# Patient Record
Sex: FEMALE | Race: AMERICAN INDIAN OR ALASKA NATIVE | NOT HISPANIC OR LATINO | URBAN - METROPOLITAN AREA
[De-identification: names, ages, dates, MRNs, and addresses within clinical notes are randomized per-mention and may not be internally consistent; named-entity substitution may affect disease eponyms.]

---

## 2017-03-22 ENCOUNTER — INPATIENT (INPATIENT)
Facility: HOSPITAL | Age: 77
LOS: 0 days | Discharge: HOME | End: 2017-03-23
Attending: EMERGENCY MEDICINE | Admitting: INTERNAL MEDICINE

## 2017-06-27 DIAGNOSIS — R51 HEADACHE: ICD-10-CM

## 2017-06-27 DIAGNOSIS — H53.8 OTHER VISUAL DISTURBANCES: ICD-10-CM

## 2017-06-27 DIAGNOSIS — Z95.1 PRESENCE OF AORTOCORONARY BYPASS GRAFT: ICD-10-CM

## 2017-06-27 DIAGNOSIS — R42 DIZZINESS AND GIDDINESS: ICD-10-CM

## 2017-06-27 DIAGNOSIS — I25.10 ATHEROSCLEROTIC HEART DISEASE OF NATIVE CORONARY ARTERY WITHOUT ANGINA PECTORIS: ICD-10-CM

## 2020-04-09 ENCOUNTER — APPOINTMENT (OUTPATIENT)
Dept: HEMATOLOGY ONCOLOGY | Facility: CLINIC | Age: 80
End: 2020-04-09

## 2020-06-09 ENCOUNTER — OUTPATIENT (OUTPATIENT)
Dept: OUTPATIENT SERVICES | Facility: HOSPITAL | Age: 80
LOS: 1 days | Discharge: HOME | End: 2020-06-09
Payer: MEDICARE

## 2020-06-09 DIAGNOSIS — R92.8 OTHER ABNORMAL AND INCONCLUSIVE FINDINGS ON DIAGNOSTIC IMAGING OF BREAST: ICD-10-CM

## 2020-06-09 PROCEDURE — G0279: CPT | Mod: 26

## 2020-06-09 PROCEDURE — 77066 DX MAMMO INCL CAD BI: CPT | Mod: 26

## 2020-06-09 PROCEDURE — 76641 ULTRASOUND BREAST COMPLETE: CPT | Mod: 26,RT

## 2020-06-17 ENCOUNTER — OUTPATIENT (OUTPATIENT)
Dept: OUTPATIENT SERVICES | Facility: HOSPITAL | Age: 80
LOS: 1 days | Discharge: HOME | End: 2020-06-17
Payer: MEDICARE

## 2020-06-17 ENCOUNTER — RESULT REVIEW (OUTPATIENT)
Age: 80
End: 2020-06-17

## 2020-06-17 DIAGNOSIS — R92.8 OTHER ABNORMAL AND INCONCLUSIVE FINDINGS ON DIAGNOSTIC IMAGING OF BREAST: ICD-10-CM

## 2020-06-17 PROCEDURE — 77065 DX MAMMO INCL CAD UNI: CPT | Mod: 26,RT

## 2020-06-17 PROCEDURE — 19083 BX BREAST 1ST LESION US IMAG: CPT | Mod: RT

## 2020-06-17 PROCEDURE — 88342 IMHCHEM/IMCYTCHM 1ST ANTB: CPT | Mod: 26

## 2020-06-17 PROCEDURE — 88341 IMHCHEM/IMCYTCHM EA ADD ANTB: CPT | Mod: 26

## 2020-06-17 PROCEDURE — 88305 TISSUE EXAM BY PATHOLOGIST: CPT | Mod: 26

## 2020-06-18 LAB — SURGICAL PATHOLOGY STUDY: SIGNIFICANT CHANGE UP

## 2020-06-22 DIAGNOSIS — C50.911 MALIGNANT NEOPLASM OF UNSPECIFIED SITE OF RIGHT FEMALE BREAST: ICD-10-CM

## 2020-06-22 DIAGNOSIS — Z17.0 ESTROGEN RECEPTOR POSITIVE STATUS [ER+]: ICD-10-CM

## 2020-06-23 ENCOUNTER — APPOINTMENT (OUTPATIENT)
Dept: BREAST CENTER | Facility: CLINIC | Age: 80
End: 2020-06-23
Payer: MEDICARE

## 2020-06-23 VITALS
TEMPERATURE: 98.1 F | HEIGHT: 61 IN | WEIGHT: 120 LBS | SYSTOLIC BLOOD PRESSURE: 110 MMHG | BODY MASS INDEX: 22.66 KG/M2 | DIASTOLIC BLOOD PRESSURE: 70 MMHG

## 2020-06-23 DIAGNOSIS — Z86.79 PERSONAL HISTORY OF OTHER DISEASES OF THE CIRCULATORY SYSTEM: ICD-10-CM

## 2020-06-23 PROCEDURE — 99204 OFFICE O/P NEW MOD 45 MIN: CPT

## 2020-06-23 RX ORDER — CLOPIDOGREL 75 MG/1
TABLET, FILM COATED ORAL
Refills: 0 | Status: ACTIVE | COMMUNITY

## 2020-06-23 RX ORDER — ASPIRIN 325 MG/1
TABLET, FILM COATED ORAL
Refills: 0 | Status: ACTIVE | COMMUNITY

## 2020-06-23 RX ORDER — CARVEDILOL 3.12 MG/1
TABLET, FILM COATED ORAL
Refills: 0 | Status: ACTIVE | COMMUNITY

## 2020-06-23 RX ORDER — FAMOTIDINE 10 MG/1
TABLET, FILM COATED ORAL
Refills: 0 | Status: ACTIVE | COMMUNITY

## 2020-06-24 RX ORDER — LISINOPRIL 30 MG/1
TABLET ORAL
Refills: 0 | Status: ACTIVE | COMMUNITY

## 2020-06-24 NOTE — ASSESSMENT
[FreeTextEntry1] : Dariana is a 79 postmenopausal F with RIGHT breast cancer, [id number], ER/[place] pos, Her 2 neg, stage 1 breast cancer. \par \par On exam, I was able to palpate a 2-3 cm mass in her right superior breast that is mobile with some overlying ecchymoses from her recent biopsy.  NO suspicious abnormalities were palpated in her left breast and no lymphadenopathy was appreciated b/l.  Her most recent imaging was a b/l dx mammogram and R breast US on 6/9/2020 which revealed in her RIGHT breast @12N3-4, a 1.4 x 1 x 1.5 cm mass which was her biopsy proven IDC. \par \par We have discussed her new diagnosis of breast cancer.  She is currently a stage 1 breast cancer, based off AJCC 8th edition.  We discussed her surgical and other treatment options at this time. \par \par In regards to her RIGHT sided breast cancer, she is a great candidate for either breast conservation surgery or a mastectomy.  Her lesion is located @12N3-4   based off the US so she is a candidate for a nipple sparing mastectomy.  However, there is no difference in survival between a lumpectomy + radiation therapy and a mastectomy.  However the rate of local recurrence is slightly higher with the lumpectomy. The rate of recurrence with a mastectomy is not zero, however, and is likely closer to 4-9%.  \par \par Regardless of if she chooses a mastectomy or a lumpectomy, she would require an evaluation of her axillary lymph nodes via a sentinel lymph node biopsy.  This is done by injecting the perioareolar breast tissue with dye prior to the surgery and removing 1-5 lymph nodes that are the first to drain the breast.  However, based off [facility] choosing wisely guidelines for patient >71 y/o with a early stage ER/WA pos, Her 2 negative breast cancer, one could consider omitting the SLN Bx.  This was discussed with the patient, however, given the suspicion for lymphovascular invasion on her biopsy, I have recommended that we proceed with a SLN Bx.  If she is not able to undergo general anesthesia due to her other medical problems, however, then we will omit the SLN bx.   \par \par At this time, she is leaning towards undergoing breast conservation therapy with a LUMPECTOMY.  Her RIGHT breast lesion is palpable on clinical exam, but I would still like her to undergo a radiofrequency guided lumpectomy on the left breast.  In the interim, we will obtain a bilateral breast MRI to further evaluate the extent of her disease. \par \par We did briefly discuss the different radiation options.  If she got a mastectomy, she would likely only need radiation therapy if she had a lot of positive margins. If she undergoes a lumpectomy, she is a candidate for both partial breast irradiation and whole breast irradiation.  We briefly discussed the differences between these two modalities.  However, based off the [id number] study for patients >age 70 with early stage breast cancer, 98% of patients receiving tamoxifen + RT and 90% of patient receiving tamoxifen alone were free from local regional recurrence.  Using this information, she may decide to forego radiation altogether, however this will be further discussed post operatively.  \par \par In regards to systemic therapy, we briefly discussed both chemotherapy and endocrine therapy. If the tumor is larger than 1 cm and her 2 negative, we would likely need to send an additional study on her tumor sample, called an oncotype DX to make the determination regarding if chemotherapy would help her.  At the completion of all these treatments, she should get endocrine therapy, at current time she would need an AI, for a total of at least 5 years.  \par \par She will need a breast MRI to determine extent of disease.  This will be scheduled for her. \par Per [facility] consensus guidelines, any patient with a diagnosis of breast cancer should be considered for panel genetic testing, as 10% of patients were found to have a mutation on panel testing.  This was offered to the patient today as she does have two daughters and she has agreed to panel genetic testing.  [facility] panel genetic testing was performed today. \par \par All of her questions were answered.  She knows to call with any further questions or concerns. I will see her after she gets her MRI breast. Of note, her  was present for the entirety of this consultation.  In addition, I have spoken with her daughter, Autumn Torres ([phone]) and discussed the plan with her as well. \par \par PLAN: \par -breast MRI \par -OR: R WLE WITH RF LOCALIZATION AND SLN BX (If unable to undergo general anesthesia, may consider omitting the SLN Bx ) \par -DIAGNOSIS: RIGHT BREAST CANCER\par -sozo \par -[facility] panel genetic testing \par -f/up after

## 2020-06-24 NOTE — REVIEW OF SYSTEMS
[Breast Lump] : breast lump [Negative] : Heme/Lymph [Breast Pain] : breast pain [Skin Wound] : skin wound [Fever] : no fever [Chills] : no chills [As Noted in HPI] : as noted in HPI [Chest Pain] : no chest pain [Palpitations] : no palpitations [Abn Vaginal Bleeding] : no unexplained vaginal bleeding [Skin Lesions] : no skin lesions [Hot Flashes] : no hot flashes

## 2020-06-24 NOTE — PHYSICAL EXAM
[Atraumatic] : atraumatic [Normocephalic] : normocephalic [EOMI] : extra ocular movement intact [No Supraclavicular Adenopathy] : no supraclavicular adenopathy [No Cervical Adenopathy] : no cervical adenopathy [Examined in the supine and seated position] : examined in the supine and seated position [Symmetrical] : symmetrical [No dominant masses] : no dominant masses left breast [No Nipple Retraction] : no right nipple retraction [No Nipple Discharge] : no left nipple discharge [No Axillary Lymphadenopathy] : no left axillary lymphadenopathy [Soft] : abdomen soft [No Edema] : no edema [Not Tender] : non-tender [No Ulceration] : no ulceration [No Rashes] : no rashes [de-identified] : in her superior breast @12N3-4, she has a 2-3 cm mass with overlying ecchymoses from her recent biopsy; no other suspicious masses palpated; no lymphadenopathy appreciated  [de-identified] : no suspicious abnormalities noted on exam

## 2020-06-24 NOTE — HISTORY OF PRESENT ILLNESS
[FreeTextEntry1] : Dariana is a 79 F who presents with a right breast cancer, [id number], ER/[place] pos, Her 2 NEG, stage 1 breast cancer. \par \par Her work up was as follows: \par 2020 -- b/l dx tomosynthesis and R breast US \par -scattered areas of fibroglandular densities\par -R: superior breast, anterior depth, 1.5 cm high density superficial mass\par -no suspicious microcalcifications b/l \par -no discrete mass in L breast \par R US \par -@12N3-4, highly vascular irregular hypoechoic mass, measuring 1.4 x 1 x 1.5 cm; correlates with mammo findings \par -no lymphadenopathy \par BIRADS 5 \par \par 2020 -- R US CNBx @12N3-4\par -IDC, moderately to poorly differentiated\par -suspicious for LVI \par -ER/[place] positive, Her 2 neg\par -Ki 67 15%\par \par She first palpated a right breast mass 1 month prior.  She thinks that it has increased in size since she first felt it.  She is a little sore since her biopsy, but she did develop a hematoma afterwards.  She denies any left sided breast complaints and denies any nipple discharge or retraction. \par \par Of note, she underwent a knee replacement surgery several years prior.  At that time, she had un-diagnosed CHF and was subsequently found to have a diseased aortic valve as well as coronary artery disease.  She subsequently underwent a CABG and aortic valve tissue based replacement.  Since her surgery, she has not had any chest pains/angina, or shortness of breath.  She has had several episodes of TIA, which has been attributed to her heart disease.  She has not been found to have any carotid artery disease, however.  She is following up with her cardiologist next week to decide if she will need to continue both the plavix and aspirin.   \par \par HISTORICAL RISK FACTORS: \par -1 prior breast biopsy as above, no prior breast surgeries \par -no family history of breast or ovarian cancer \par -, age at first live birth was 37 \par -no prior OCP use \par -no gyn surgeries

## 2020-06-24 NOTE — PAST MEDICAL HISTORY
[Menarche Age ____] : age at menarche was [unfilled] [Menopause Age____] : age at menopause was [unfilled] [History of Hormone Replacement Treatment] : has no history of hormone replacement treatment [Total Preg ___] : G[unfilled] [Live Births ___] : P[unfilled]  [Age At Live Birth ___] : Age at live birth: [unfilled] [FreeTextEntry5] : denies  [FreeTextEntry6] : denies [FreeTextEntry7] : yes in past x 1 month, stopped in her 30s [FreeTextEntry8] : yes x 1 month

## 2020-06-30 ENCOUNTER — OUTPATIENT (OUTPATIENT)
Dept: OUTPATIENT SERVICES | Facility: HOSPITAL | Age: 80
LOS: 1 days | Discharge: HOME | End: 2020-06-30
Payer: MEDICARE

## 2020-06-30 ENCOUNTER — RESULT REVIEW (OUTPATIENT)
Age: 80
End: 2020-06-30

## 2020-06-30 DIAGNOSIS — C50.111 MALIGNANT NEOPLASM OF CENTRAL PORTION OF RIGHT FEMALE BREAST: ICD-10-CM

## 2020-06-30 PROCEDURE — 77049 MRI BREAST C-+ W/CAD BI: CPT | Mod: 26

## 2020-07-21 ENCOUNTER — RESULT REVIEW (OUTPATIENT)
Age: 80
End: 2020-07-21

## 2020-07-21 ENCOUNTER — OUTPATIENT (OUTPATIENT)
Dept: OUTPATIENT SERVICES | Facility: HOSPITAL | Age: 80
LOS: 1 days | Discharge: HOME | End: 2020-07-21
Payer: MEDICARE

## 2020-07-21 VITALS
SYSTOLIC BLOOD PRESSURE: 150 MMHG | WEIGHT: 119.93 LBS | HEIGHT: 62 IN | RESPIRATION RATE: 16 BRPM | DIASTOLIC BLOOD PRESSURE: 77 MMHG | TEMPERATURE: 97 F | HEART RATE: 66 BPM | OXYGEN SATURATION: 99 %

## 2020-07-21 DIAGNOSIS — C50.111 MALIGNANT NEOPLASM OF CENTRAL PORTION OF RIGHT FEMALE BREAST: ICD-10-CM

## 2020-07-21 DIAGNOSIS — Z01.818 ENCOUNTER FOR OTHER PREPROCEDURAL EXAMINATION: ICD-10-CM

## 2020-07-21 DIAGNOSIS — T82.898A OTHER SPECIFIED COMPLICATION OF VASCULAR PROSTHETIC DEVICES, IMPLANTS AND GRAFTS, INITIAL ENCOUNTER: Chronic | ICD-10-CM

## 2020-07-21 DIAGNOSIS — Z96.652 PRESENCE OF LEFT ARTIFICIAL KNEE JOINT: Chronic | ICD-10-CM

## 2020-07-21 LAB
ALBUMIN SERPL ELPH-MCNC: 4.5 G/DL — SIGNIFICANT CHANGE UP (ref 3.5–5.2)
ALP SERPL-CCNC: 118 U/L — HIGH (ref 30–115)
ALT FLD-CCNC: 12 U/L — SIGNIFICANT CHANGE UP (ref 0–41)
ANION GAP SERPL CALC-SCNC: 10 MMOL/L — SIGNIFICANT CHANGE UP (ref 7–14)
APTT BLD: 32 SEC — SIGNIFICANT CHANGE UP (ref 27–39.2)
AST SERPL-CCNC: 19 U/L — SIGNIFICANT CHANGE UP (ref 0–41)
BASOPHILS # BLD AUTO: 0.05 K/UL — SIGNIFICANT CHANGE UP (ref 0–0.2)
BASOPHILS NFR BLD AUTO: 0.8 % — SIGNIFICANT CHANGE UP (ref 0–1)
BILIRUB SERPL-MCNC: 0.3 MG/DL — SIGNIFICANT CHANGE UP (ref 0.2–1.2)
BUN SERPL-MCNC: 31 MG/DL — HIGH (ref 10–20)
CALCIUM SERPL-MCNC: 9.4 MG/DL — SIGNIFICANT CHANGE UP (ref 8.5–10.1)
CHLORIDE SERPL-SCNC: 104 MMOL/L — SIGNIFICANT CHANGE UP (ref 98–110)
CO2 SERPL-SCNC: 24 MMOL/L — SIGNIFICANT CHANGE UP (ref 17–32)
CREAT SERPL-MCNC: 0.8 MG/DL — SIGNIFICANT CHANGE UP (ref 0.7–1.5)
EOSINOPHIL # BLD AUTO: 0.29 K/UL — SIGNIFICANT CHANGE UP (ref 0–0.7)
EOSINOPHIL NFR BLD AUTO: 4.5 % — SIGNIFICANT CHANGE UP (ref 0–8)
GLUCOSE SERPL-MCNC: 79 MG/DL — SIGNIFICANT CHANGE UP (ref 70–99)
HCT VFR BLD CALC: 38.1 % — SIGNIFICANT CHANGE UP (ref 37–47)
HGB BLD-MCNC: 12 G/DL — SIGNIFICANT CHANGE UP (ref 12–16)
IMM GRANULOCYTES NFR BLD AUTO: 0.3 % — SIGNIFICANT CHANGE UP (ref 0.1–0.3)
INR BLD: 0.99 RATIO — SIGNIFICANT CHANGE UP (ref 0.65–1.3)
LYMPHOCYTES # BLD AUTO: 1.73 K/UL — SIGNIFICANT CHANGE UP (ref 1.2–3.4)
LYMPHOCYTES # BLD AUTO: 26.7 % — SIGNIFICANT CHANGE UP (ref 20.5–51.1)
MCHC RBC-ENTMCNC: 30.6 PG — SIGNIFICANT CHANGE UP (ref 27–31)
MCHC RBC-ENTMCNC: 31.5 G/DL — LOW (ref 32–37)
MCV RBC AUTO: 97.2 FL — SIGNIFICANT CHANGE UP (ref 81–99)
MONOCYTES # BLD AUTO: 0.59 K/UL — SIGNIFICANT CHANGE UP (ref 0.1–0.6)
MONOCYTES NFR BLD AUTO: 9.1 % — SIGNIFICANT CHANGE UP (ref 1.7–9.3)
NEUTROPHILS # BLD AUTO: 3.8 K/UL — SIGNIFICANT CHANGE UP (ref 1.4–6.5)
NEUTROPHILS NFR BLD AUTO: 58.6 % — SIGNIFICANT CHANGE UP (ref 42.2–75.2)
NRBC # BLD: 0 /100 WBCS — SIGNIFICANT CHANGE UP (ref 0–0)
PLATELET # BLD AUTO: 216 K/UL — SIGNIFICANT CHANGE UP (ref 130–400)
POTASSIUM SERPL-MCNC: 4.4 MMOL/L — SIGNIFICANT CHANGE UP (ref 3.5–5)
POTASSIUM SERPL-SCNC: 4.4 MMOL/L — SIGNIFICANT CHANGE UP (ref 3.5–5)
PROT SERPL-MCNC: 7.8 G/DL — SIGNIFICANT CHANGE UP (ref 6–8)
PROTHROM AB SERPL-ACNC: 11.4 SEC — SIGNIFICANT CHANGE UP (ref 9.95–12.87)
RBC # BLD: 3.92 M/UL — LOW (ref 4.2–5.4)
RBC # FLD: 12.1 % — SIGNIFICANT CHANGE UP (ref 11.5–14.5)
SODIUM SERPL-SCNC: 138 MMOL/L — SIGNIFICANT CHANGE UP (ref 135–146)
WBC # BLD: 6.48 K/UL — SIGNIFICANT CHANGE UP (ref 4.8–10.8)
WBC # FLD AUTO: 6.48 K/UL — SIGNIFICANT CHANGE UP (ref 4.8–10.8)

## 2020-07-21 PROCEDURE — 93010 ELECTROCARDIOGRAM REPORT: CPT

## 2020-07-21 PROCEDURE — 71046 X-RAY EXAM CHEST 2 VIEWS: CPT | Mod: 26

## 2020-07-21 NOTE — H&P PST ADULT - HISTORY OF PRESENT ILLNESS
80 y/o female PMH; HTN, abnormal mammogram-right breast, chronic right knee pain, left knee replacement, is scheduled for right breast biopsy, excision with radiofrequency localizer, mapping and possible axillary node dissection

## 2020-07-21 NOTE — H&P PST ADULT - REASON FOR ADMISSION
right breast biopsy, excision with radiofrequency localizer, mapping and possible axillary node dissection scheduled for7/28/2020 under  general anesthesia

## 2020-07-23 PROBLEM — I10 ESSENTIAL (PRIMARY) HYPERTENSION: Chronic | Status: ACTIVE | Noted: 2020-07-21

## 2020-07-25 ENCOUNTER — OUTPATIENT (OUTPATIENT)
Dept: OUTPATIENT SERVICES | Facility: HOSPITAL | Age: 80
LOS: 1 days | Discharge: HOME | End: 2020-07-25

## 2020-07-25 ENCOUNTER — LABORATORY RESULT (OUTPATIENT)
Age: 80
End: 2020-07-25

## 2020-07-25 DIAGNOSIS — T82.898A OTHER SPECIFIED COMPLICATION OF VASCULAR PROSTHETIC DEVICES, IMPLANTS AND GRAFTS, INITIAL ENCOUNTER: Chronic | ICD-10-CM

## 2020-07-25 DIAGNOSIS — Z96.652 PRESENCE OF LEFT ARTIFICIAL KNEE JOINT: Chronic | ICD-10-CM

## 2020-07-25 DIAGNOSIS — Z11.59 ENCOUNTER FOR SCREENING FOR OTHER VIRAL DISEASES: ICD-10-CM

## 2020-07-27 ENCOUNTER — RESULT REVIEW (OUTPATIENT)
Age: 80
End: 2020-07-27

## 2020-07-27 ENCOUNTER — OUTPATIENT (OUTPATIENT)
Dept: OUTPATIENT SERVICES | Facility: HOSPITAL | Age: 80
LOS: 1 days | Discharge: HOME | End: 2020-07-27
Payer: MEDICARE

## 2020-07-27 DIAGNOSIS — R92.8 OTHER ABNORMAL AND INCONCLUSIVE FINDINGS ON DIAGNOSTIC IMAGING OF BREAST: ICD-10-CM

## 2020-07-27 DIAGNOSIS — Z96.652 PRESENCE OF LEFT ARTIFICIAL KNEE JOINT: Chronic | ICD-10-CM

## 2020-07-27 DIAGNOSIS — T82.898A OTHER SPECIFIED COMPLICATION OF VASCULAR PROSTHETIC DEVICES, IMPLANTS AND GRAFTS, INITIAL ENCOUNTER: Chronic | ICD-10-CM

## 2020-07-27 PROCEDURE — 19285 PERQ DEV BREAST 1ST US IMAG: CPT | Mod: RT

## 2020-07-27 PROCEDURE — 77065 DX MAMMO INCL CAD UNI: CPT | Mod: 26,RT

## 2020-07-28 ENCOUNTER — OUTPATIENT (OUTPATIENT)
Dept: OUTPATIENT SERVICES | Facility: HOSPITAL | Age: 80
LOS: 1 days | Discharge: HOME | End: 2020-07-28
Payer: MEDICARE

## 2020-07-28 ENCOUNTER — APPOINTMENT (OUTPATIENT)
Dept: BREAST CENTER | Facility: AMBULATORY SURGERY CENTER | Age: 80
End: 2020-07-28
Payer: MEDICARE

## 2020-07-28 ENCOUNTER — RESULT REVIEW (OUTPATIENT)
Age: 80
End: 2020-07-28

## 2020-07-28 VITALS
HEART RATE: 70 BPM | DIASTOLIC BLOOD PRESSURE: 75 MMHG | SYSTOLIC BLOOD PRESSURE: 158 MMHG | OXYGEN SATURATION: 97 % | RESPIRATION RATE: 20 BRPM

## 2020-07-28 VITALS
WEIGHT: 119.93 LBS | DIASTOLIC BLOOD PRESSURE: 70 MMHG | TEMPERATURE: 98 F | OXYGEN SATURATION: 98 % | RESPIRATION RATE: 20 BRPM | HEIGHT: 62 IN | HEART RATE: 57 BPM | SYSTOLIC BLOOD PRESSURE: 146 MMHG

## 2020-07-28 DIAGNOSIS — Z96.652 PRESENCE OF LEFT ARTIFICIAL KNEE JOINT: Chronic | ICD-10-CM

## 2020-07-28 DIAGNOSIS — T82.898A OTHER SPECIFIED COMPLICATION OF VASCULAR PROSTHETIC DEVICES, IMPLANTS AND GRAFTS, INITIAL ENCOUNTER: Chronic | ICD-10-CM

## 2020-07-28 PROCEDURE — 88307 TISSUE EXAM BY PATHOLOGIST: CPT | Mod: 26

## 2020-07-28 PROCEDURE — 88341 IMHCHEM/IMCYTCHM EA ADD ANTB: CPT | Mod: 26

## 2020-07-28 PROCEDURE — 19301 PARTIAL MASTECTOMY: CPT | Mod: RT

## 2020-07-28 PROCEDURE — 88305 TISSUE EXAM BY PATHOLOGIST: CPT | Mod: 26

## 2020-07-28 PROCEDURE — 38525 BIOPSY/REMOVAL LYMPH NODES: CPT | Mod: RT

## 2020-07-28 PROCEDURE — 38900 IO MAP OF SENT LYMPH NODE: CPT | Mod: RT

## 2020-07-28 PROCEDURE — 88342 IMHCHEM/IMCYTCHM 1ST ANTB: CPT | Mod: 26

## 2020-07-28 PROCEDURE — 78195 LYMPH SYSTEM IMAGING: CPT | Mod: 26

## 2020-07-28 RX ORDER — OXYCODONE HYDROCHLORIDE 5 MG/1
5 TABLET ORAL ONCE
Refills: 0 | Status: DISCONTINUED | OUTPATIENT
Start: 2020-07-28 | End: 2020-07-28

## 2020-07-28 RX ORDER — ONDANSETRON 8 MG/1
4 TABLET, FILM COATED ORAL ONCE
Refills: 0 | Status: DISCONTINUED | OUTPATIENT
Start: 2020-07-28 | End: 2020-08-12

## 2020-07-28 RX ORDER — HYDROMORPHONE HYDROCHLORIDE 2 MG/ML
0.5 INJECTION INTRAMUSCULAR; INTRAVENOUS; SUBCUTANEOUS ONCE
Refills: 0 | Status: DISCONTINUED | OUTPATIENT
Start: 2020-07-28 | End: 2020-07-28

## 2020-07-28 RX ORDER — SODIUM CHLORIDE 9 MG/ML
1000 INJECTION, SOLUTION INTRAVENOUS
Refills: 0 | Status: DISCONTINUED | OUTPATIENT
Start: 2020-07-28 | End: 2020-08-12

## 2020-07-28 RX ORDER — IBUPROFEN 200 MG
1 TABLET ORAL
Qty: 16 | Refills: 0
Start: 2020-07-28 | End: 2020-07-31

## 2020-07-28 RX ADMIN — OXYCODONE HYDROCHLORIDE 5 MILLIGRAM(S): 5 TABLET ORAL at 15:54

## 2020-07-28 RX ADMIN — SODIUM CHLORIDE 100 MILLILITER(S): 9 INJECTION, SOLUTION INTRAVENOUS at 14:25

## 2020-07-28 NOTE — PRE-ANESTHESIA EVALUATION ADULT - NSANTHOSAYNRD_GEN_A_CORE
No. ZO screening performed.  STOP BANG Legend: 0-2 = LOW Risk; 3-4 = INTERMEDIATE Risk; 5-8 = HIGH Risk

## 2020-07-28 NOTE — BRIEF OPERATIVE NOTE - LESION SIZE
right breast mass, additional anterior/superior margin, additional lateral margin, additional posterior margin, additional medial margin; 1 right axillary sentinel lymph node (hot and blue, 1200)

## 2020-07-28 NOTE — ASU DISCHARGE PLAN (ADULT/PEDIATRIC) - CARE PROVIDER_API CALL
Keely Camp)  Surgery  [address] [place] [address]  Phone: [phone]  Fax: [phone]  Follow Up Time:

## 2020-07-28 NOTE — CHART NOTE - NSCHARTNOTEFT_GEN_A_CORE
PACU ANESTHESIA ADMISSION NOTE      Procedure: Lumpectomy of right breast with sentinel node biopsy    Post op diagnosis:  Breast cancer, right      ____  Intubated  TV:______       Rate: ______      FiO2: ______    __x__  Patent Airway    __x__  Full return of protective reflexes    __x__  Full recovery from anesthesia / back to baseline     Vitals:   T: 97.3          R:  14                BP: 144/63                 Sat:  100                 P: 65      Mental Status:  __x__ Awake   _x____ Alert   _____ Drowsy   _____ Sedated    Nausea/Vomiting:  __x__ NO  ______Yes,   See Post - Op Orders          Pain Scale (0-10):  _____    Treatment: x____ None    ____ See Post - Op/PCA Orders    Post - Operative Fluids:   ____ Oral   ___x_ See Post - Op Orders    Plan: Discharge:   __x__Home       _____Floor     _____Critical Care    _____  Other:_________________    Comments:  Pt brought to RR spontaneously breathing, hemodynamically stable

## 2020-07-30 DIAGNOSIS — N63.10 UNSPECIFIED LUMP IN THE RIGHT BREAST, UNSPECIFIED QUADRANT: ICD-10-CM

## 2020-08-04 LAB — SURGICAL PATHOLOGY STUDY: SIGNIFICANT CHANGE UP

## 2020-08-05 DIAGNOSIS — Z11.59 ENCOUNTER FOR SCREENING FOR OTHER VIRAL DISEASES: ICD-10-CM

## 2020-08-05 DIAGNOSIS — N64.2 ATROPHY OF BREAST: ICD-10-CM

## 2020-08-05 DIAGNOSIS — C50.911 MALIGNANT NEOPLASM OF UNSPECIFIED SITE OF RIGHT FEMALE BREAST: ICD-10-CM

## 2020-08-05 DIAGNOSIS — C77.1 SECONDARY AND UNSPECIFIED MALIGNANT NEOPLASM OF INTRATHORACIC LYMPH NODES: ICD-10-CM

## 2020-08-05 DIAGNOSIS — N64.1 FAT NECROSIS OF BREAST: ICD-10-CM

## 2020-08-05 DIAGNOSIS — Z79.02 LONG TERM (CURRENT) USE OF ANTITHROMBOTICS/ANTIPLATELETS: ICD-10-CM

## 2020-08-05 DIAGNOSIS — Z79.82 LONG TERM (CURRENT) USE OF ASPIRIN: ICD-10-CM

## 2020-08-05 DIAGNOSIS — N60.11 DIFFUSE CYSTIC MASTOPATHY OF RIGHT BREAST: ICD-10-CM

## 2020-08-05 DIAGNOSIS — Z96.652 PRESENCE OF LEFT ARTIFICIAL KNEE JOINT: ICD-10-CM

## 2020-08-05 DIAGNOSIS — Z17.0 ESTROGEN RECEPTOR POSITIVE STATUS [ER+]: ICD-10-CM

## 2020-08-05 DIAGNOSIS — I10 ESSENTIAL (PRIMARY) HYPERTENSION: ICD-10-CM

## 2020-08-17 ENCOUNTER — APPOINTMENT (OUTPATIENT)
Dept: BREAST CENTER | Facility: CLINIC | Age: 80
End: 2020-08-17

## 2020-08-18 ENCOUNTER — LABORATORY RESULT (OUTPATIENT)
Age: 80
End: 2020-08-18

## 2020-08-18 ENCOUNTER — OUTPATIENT (OUTPATIENT)
Dept: OUTPATIENT SERVICES | Facility: HOSPITAL | Age: 80
LOS: 1 days | Discharge: HOME | End: 2020-08-18

## 2020-08-18 DIAGNOSIS — Z11.59 ENCOUNTER FOR SCREENING FOR OTHER VIRAL DISEASES: ICD-10-CM

## 2020-08-18 DIAGNOSIS — Z96.652 PRESENCE OF LEFT ARTIFICIAL KNEE JOINT: Chronic | ICD-10-CM

## 2020-08-18 DIAGNOSIS — T82.898A OTHER SPECIFIED COMPLICATION OF VASCULAR PROSTHETIC DEVICES, IMPLANTS AND GRAFTS, INITIAL ENCOUNTER: Chronic | ICD-10-CM

## 2020-08-20 NOTE — ASU PATIENT PROFILE, ADULT - PSH
Carotid stent occlusion    History of lumpectomy of right breast    Total knee replacement status, left

## 2020-08-21 ENCOUNTER — RESULT REVIEW (OUTPATIENT)
Age: 80
End: 2020-08-21

## 2020-08-21 ENCOUNTER — APPOINTMENT (OUTPATIENT)
Dept: BREAST CENTER | Facility: AMBULATORY SURGERY CENTER | Age: 80
End: 2020-08-21
Payer: MEDICARE

## 2020-08-21 ENCOUNTER — OUTPATIENT (OUTPATIENT)
Dept: OUTPATIENT SERVICES | Facility: HOSPITAL | Age: 80
LOS: 1 days | Discharge: HOME | End: 2020-08-21
Payer: MEDICARE

## 2020-08-21 VITALS
OXYGEN SATURATION: 100 % | RESPIRATION RATE: 20 BRPM | DIASTOLIC BLOOD PRESSURE: 70 MMHG | HEART RATE: 67 BPM | SYSTOLIC BLOOD PRESSURE: 140 MMHG

## 2020-08-21 VITALS
SYSTOLIC BLOOD PRESSURE: 159 MMHG | DIASTOLIC BLOOD PRESSURE: 72 MMHG | WEIGHT: 119.93 LBS | HEART RATE: 98 BPM | OXYGEN SATURATION: 98 % | TEMPERATURE: 98 F | HEIGHT: 62 IN | RESPIRATION RATE: 18 BRPM

## 2020-08-21 DIAGNOSIS — Z96.652 PRESENCE OF LEFT ARTIFICIAL KNEE JOINT: Chronic | ICD-10-CM

## 2020-08-21 DIAGNOSIS — T82.898A OTHER SPECIFIED COMPLICATION OF VASCULAR PROSTHETIC DEVICES, IMPLANTS AND GRAFTS, INITIAL ENCOUNTER: Chronic | ICD-10-CM

## 2020-08-21 DIAGNOSIS — Z98.890 OTHER SPECIFIED POSTPROCEDURAL STATES: Chronic | ICD-10-CM

## 2020-08-21 PROCEDURE — 19301 PARTIAL MASTECTOMY: CPT | Mod: RT,58

## 2020-08-21 PROCEDURE — 88305 TISSUE EXAM BY PATHOLOGIST: CPT | Mod: 26

## 2020-08-21 RX ORDER — FAMOTIDINE 10 MG/ML
0 INJECTION INTRAVENOUS
Qty: 0 | Refills: 0 | DISCHARGE

## 2020-08-21 RX ORDER — CARVEDILOL PHOSPHATE 80 MG/1
1 CAPSULE, EXTENDED RELEASE ORAL
Qty: 0 | Refills: 0 | DISCHARGE

## 2020-08-21 RX ORDER — CLOPIDOGREL BISULFATE 75 MG/1
1 TABLET, FILM COATED ORAL
Qty: 0 | Refills: 0 | DISCHARGE

## 2020-08-21 RX ORDER — AMLODIPINE BESYLATE 2.5 MG/1
0 TABLET ORAL
Qty: 0 | Refills: 0 | DISCHARGE

## 2020-08-21 RX ORDER — OXYCODONE AND ACETAMINOPHEN 5; 325 MG/1; MG/1
1 TABLET ORAL ONCE
Refills: 0 | Status: DISCONTINUED | OUTPATIENT
Start: 2020-08-21 | End: 2020-08-21

## 2020-08-21 RX ORDER — LISINOPRIL 2.5 MG/1
1 TABLET ORAL
Qty: 0 | Refills: 0 | DISCHARGE

## 2020-08-21 RX ORDER — ASPIRIN/CALCIUM CARB/MAGNESIUM 324 MG
1 TABLET ORAL
Qty: 0 | Refills: 0 | DISCHARGE

## 2020-08-21 RX ORDER — DIPHENHYDRAMINE HCL 50 MG
0 CAPSULE ORAL
Qty: 0 | Refills: 0 | DISCHARGE

## 2020-08-21 RX ORDER — SODIUM CHLORIDE 9 MG/ML
1000 INJECTION, SOLUTION INTRAVENOUS
Refills: 0 | Status: DISCONTINUED | OUTPATIENT
Start: 2020-08-21 | End: 2020-09-05

## 2020-08-21 RX ORDER — IBUPROFEN 200 MG
1 TABLET ORAL
Qty: 21 | Refills: 0
Start: 2020-08-21 | End: 2020-08-27

## 2020-08-21 RX ADMIN — SODIUM CHLORIDE 75 MILLILITER(S): 9 INJECTION, SOLUTION INTRAVENOUS at 08:58

## 2020-08-21 RX ADMIN — OXYCODONE AND ACETAMINOPHEN 1 TABLET(S): 5; 325 TABLET ORAL at 10:06

## 2020-08-21 NOTE — ASU DISCHARGE PLAN (ADULT/PEDIATRIC) - PROVIDER TOKENS
PROVIDER:[TOKEN:[[id number]],SCHEDULEDAPPT:[09/02/2020],SCHEDULEDAPPTTIME:[01:30 PM],ESTABLISHEDPATIENT:[T]]

## 2020-08-21 NOTE — ASU DISCHARGE PLAN (ADULT/PEDIATRIC) - ASU DC SPECIAL INSTRUCTIONSFT
What to expect  1. You will have dermabond covering your incision. This is skin glue and creates an artificial scar over the wound.  2. A small amount of bruising is normal after surgery    Wound Care  1. You may shower after 24-48 hrs after surgery.  2. When showering, do not try to scrub the incision. Do not soak the incision (baths/swimming/hot tubs) for 2 weeks.  3. The dermabond will start to come off after about 2 weeks. Do not try to pick off the edges that have loosened.  4. You should wear a sports bra or a more supportive bra day and night for 1-2 weeks post operatively. This will help with your pain after surgery.    Restrictions  1. It is always beneficial to ambulate regularly after surgery  2. You may resume normal activities the day after surgery (as tolerated). (It is okay to lift heavy things)  3. Avoid bras/clothing that directly put pressure on the incision  4. Most home medications can be restarted the day after surgery (24 hours post-op). Specific instructions for blood thinners/aspirin will be given to patient per the medication reconciliation form  5. Do NOT make important decisions or drive while on opioid pain medications    Pain control  1. You will be given a script for ibuprofen 600 mg, 1 tablet every 8 hrs as needed for pain. If you still require more medications, you can alternate between Tylenol and motrin.  2. Please take the pain medication with food to decrease GI upset  3. If requiring stronger medications, please call the office at [phone] to speak with a practitioner as this may be a sign of prolonged healing/seroma/hematoma/infection/necrosis    Diet  No Restrictions    When to call us  1. if you develop fevers or chills  2. if the incisions become red, tender and warm to touch  3. if the drainage from the bulb increases in volume, turn dark red with clots, stops draining completely  4. if you notice purulent drainage from either the incision or the drain  5. if you notice that your chest wall is becoming "puffy" or filled with fluid after surgery    Follow up care  1. follow up appointment is usually 1-2 weeks after surgery  2. You will receive final pathology and further instructions during the first follow up visit.

## 2020-08-21 NOTE — ASU DISCHARGE PLAN (ADULT/PEDIATRIC) - CARE PROVIDER_API CALL
Keely Camp)  Surgery  [address] [place] [address]  Phone: [phone]  Fax: [phone]  Established Patient  Scheduled Appointment: 09/02/2020 01:30 PM

## 2020-08-21 NOTE — CHART NOTE - NSCHARTNOTEFT_GEN_A_CORE
PACU ANESTHESIA ADMISSION NOTE      Procedure: Repeat excision of malignant neoplasm of right breast    Post op diagnosis:  Cancer of right breast      ____  Intubated  TV:______       Rate: ______      FiO2: ______    _x___  Patent Airway    _x___  Full return of protective reflexes    _x___  Full recovery from anesthesia / back to baseline status    Vitals:  T(C): 36.7  HR: 98  BP: 127/72  RR: 18  SpO2: 98%    Mental Status:  _x___ Awake   _____ Alert   _____ Drowsy   _____ Sedated    Nausea/Vomiting:  _x___  NO       ______Yes,   See Post - Op Orders         Pain Scale (0-10):  __0___    Treatment: _x___ None    ____ See Post - Op/PCA Orders    Post - Operative Fluids:   ___ Oral   __x__ See Post - Op Orders    Plan: Discharge:   _x___Home       _____Floor     _____Critical Care    _____  Other:_________________    Comments:  No anesthesia issues or complications noted.  Discharge when criteria met.

## 2020-08-24 LAB — SURGICAL PATHOLOGY STUDY: SIGNIFICANT CHANGE UP

## 2020-08-27 DIAGNOSIS — Z79.82 LONG TERM (CURRENT) USE OF ASPIRIN: ICD-10-CM

## 2020-08-27 DIAGNOSIS — C50.911 MALIGNANT NEOPLASM OF UNSPECIFIED SITE OF RIGHT FEMALE BREAST: ICD-10-CM

## 2020-08-27 DIAGNOSIS — Z17.0 ESTROGEN RECEPTOR POSITIVE STATUS [ER+]: ICD-10-CM

## 2020-08-27 DIAGNOSIS — N64.2 ATROPHY OF BREAST: ICD-10-CM

## 2020-08-27 DIAGNOSIS — Z79.01 LONG TERM (CURRENT) USE OF ANTICOAGULANTS: ICD-10-CM

## 2020-08-27 DIAGNOSIS — I10 ESSENTIAL (PRIMARY) HYPERTENSION: ICD-10-CM

## 2020-09-02 ENCOUNTER — APPOINTMENT (OUTPATIENT)
Dept: BREAST CENTER | Facility: CLINIC | Age: 80
End: 2020-09-02
Payer: MEDICARE

## 2020-09-02 VITALS
SYSTOLIC BLOOD PRESSURE: 126 MMHG | TEMPERATURE: 98.4 F | HEIGHT: 61 IN | WEIGHT: 120 LBS | DIASTOLIC BLOOD PRESSURE: 80 MMHG | BODY MASS INDEX: 22.66 KG/M2

## 2020-09-02 PROCEDURE — 99024 POSTOP FOLLOW-UP VISIT: CPT

## 2020-09-03 NOTE — DATA REVIEWED
[FreeTextEntry1] : [facility] Accession Number : [id number]\par \par WISAM GIPSON                        5\par \par \par \par Surgical Final Report\par \par \par \par \par Final Diagnosis\par 1. 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 The intraductal\par component is widely free (>2.0 mm) of all inked and designated\par surgical margins.\par - Surrounding unremarkable atrophic fatty breast tissue.\par - For hormone receptor and oncoprotein expression status please\par see the pathology report from the prior needle core biopsy\par specimen ([id number]).\par - AJCC 8th Edition Pathologic Stage: pT1c, p(sn)N1a, pMx.\par \par 2. Breast, right anterior margin, excision:\par - Benign unremarkable atrophic fatty breast tissue. Negative for\par carcinoma.\par \par 3. Breast, right medial margin, excision:\par - Benign atrophic fatty breast breast tissue with focal prior\par biopsy site changes.\par - Negative for carcinoma.\par \par 4. Breast, right posterior margin, excision:\par -  Benign atrophic fatty breast breast tissue with focal prior\par biopsy site changes.\par - Negative for carcinoma.\par \par 5. Breast, right lateral margin, excision:\par - Benign unremarkable atrophic fatty breast tissue. Negative for\par carcinoma.\par \par 6. Breast, right axillary sentinel lymph node #1, biopsy:\par - Metastatic carcinoma present in one lymph node (1/1), the\par largest contiguous focus of which measures 2.5 mm (microscopic\par measurement).\par \par \par \par \par \par \par WISAM GIPSON                        5\par \par \par \par Surgical Final Report\par \par \par \par \par - No definitive foci of extracapsular extension are seen.\par \par Verified by: Faheem Dillon M.D.\par (Electronic Signature)\par Reported on: 08/04/20 15:30 EDT, [address], [place],\[place] [address]\par Phone: [phone]   Fax: [phone]\par _________________________________________________________________\par \par Comment\par Case reported to Tumor Registry.\par \par Synoptic Summary\par \par SURGICAL PATHOLOGY CANCER CASE SUMMARY\par \par INVASIVE CARCINOMA OF THE BREAST\par \par PROCEDURE: Excision\par SPECIMEN LATERALITY: Right\par TUMOR SITE: INVASIVE CARCINOMA: Retroareolar\par TUMOR SIZE: 11 mm\par HISTOLOGIC TYPE: Invasive carcinoma of no special type\par HISTOLOGIC GRADE ([place] Histologic Score)\par GLANDULAR (ACINAR)/TUBULAR DIFFERENTIATION: Score 2\par NUCLEAR PLEOMORPHISM: Score 2\par MITOTIC RATE: Score 3\par OVERALL GRADE: Grade 2\par TUMOR FOCALITY: Single focus of invasive carcinoma\par DUCTAL CARCINOMA IN SITU (DCIS): Present, negative for EIC\par ARCHITECTURAL PATTERNS: Solid\par NUCLEAR GRADE: Grade II (intermediate)\par NECROSIS: Not identified\par LOBULAR CARCINOMA IN SITU (LCIS): No LCIS in specimen\par MARGINS\par INVASIVE CARCINOMA: Positive for invasive carcinoma;\par superior margin unifocal\par DCIS: Uninvolved by DCIS\par REGIONAL LYMPH NODES: Involved by tumor cells\par Number of Lymph Nodes with Macrometastases (>2 mm): 1\par Number of Lymph Nodes with Micrometastases (>0.2 mm to 2 mm\par and/or >200 cells): 0\par Number of Lymph Nodes with Isolated Tumor cells (<0.2 mm and\par <200 cells): 0\par Size of largest Metastatic Deposit: 2.5 mm\par Extranodal extension: Not identified\par TREATMENT EFFECT: No known presurgical therapy\par LYMPHOVASCULAR INVASION: Cannot be determined\par \par \par \par \par \par WISAM GIPSON                        5\par \par \par \par Surgical Final Report\par \par \par \par \par DERMAL LYMPHOVASCULAR INVASION: No skin present\par PATHOLOGIC STAGING\par PRIMARY TUMOR: pT1c: Tumor >10 mm but <20 mm in greatest\par dimension\par REGIONAL LYMPH NODES: (sn): [place] nodes evaluated\par pN1a: Metastases in 1 to 3 axillary lymph nodes, at least\par 1 metastasis larger than 2.0 mm\par MICROCALCIFICATIONS: Not identified\par CLINICAL HISTORY: Radiologic finding: mass or architectural\par distortion\par BIOMARKERS\par ESTROGEN RECEPTOR (ER): Positive; 100% strong\par PROGESTERONE RECEPTOR (PgR):  Positive, 80% moderate to\par strong\par HER2 (by immunohistochemistry): Negative (score 1+)\par HER2 (by in situ hybridization): Not performed\par Ki-67\par Percent positive nuclei: 15%\par \par Clinical History\par Right wide local excision. SLN biopsy\par COVID (-)\par \par Specimen(s) Submitted\par 1     Right breast mass\par Time obtained: 12:59 pm\par 2     Right breast additional anterior margin\par Time obtained: 1:00 pm\par 3     Right breast additional medial margin\par Time obtained: 1:01 pm\par 4     Right breast additional posterior margin\par Time obtained: 1:02 pm\par 5     Right breast lateral margin\par Time obtained: 1:03 pm\par 6     Right breast axillary node #1\par Time obtained: 1:30 pm\par \par Gross Description\par 1. The specimen is received fresh, labeled "right breast mass\par radio frequency localizer, single anterior, double lateral,\par triple superior" and consists of a fragment of fibrofatty tissue,\par measuring 5 x 5 x 2 cm. The specimen is inked by the surgeon as\par follows: superior margin - blue, inferior margin- green, lateral\par margin - yellow, medial margin - orange, anterior margin - red,\par and deep margin- black. The specimen is serially sectioned from\par anterior to posterior to reveal a 1.5 cm tan white mass 3 cm from\par the deep margin. The specimen is submitted entirely.\par \par \par \par \par \par \par BROWN, CIRILO                        5\par \par \par \par Surgical Final Report\par \par \par \par \par Summary of Sections:\par 1A - Anterior margin perpendicular -1\par 1B - Posterior margin perpendicular -1\par 1C - One full cross section anterior to posterior -1\par 1D-1E - One cross section -2\par 1F-1G - One cross section -2\par 1H-1I - One cross section -2\par 1J-1K - One cross section, seed in block J -2\par 1L-1M - One cross section -2\par 1N-1O - One cross section -2\par 1P-1Q - One cross section -2\par 1R - One cross section -1\par \par Total blocks - 18\par \par 2. The specimen is received fresh, labeled "right breast\par additional anterior margin stitch  marks new anterior margin" and\par consists of a fragment of tan yellow fibroadipose tissue,\par measuring 3.5 x 2.5 x 1.0 cm. The new margin is inked black and\par the old margin is inked orange. The specimen is serially\par sectioned and submitted entirely. (3 blocks)\par \par 3. The specimen is received fresh, labeled "right breast\par additional medial margin stitch marks new medial margin" and\par consists of a fragment of fibroadipose tissue, measuring 3 x 2.5\par x 1.0 cm. The new margin is inked black and the old margin is\par inked orange. The specimen is serially sectioned and submitted\par entirely. (3 blocks)\par \par 4. The specimen is received fresh, labeled "right breast\par additional posterior margin stitch marks new posterior margin"\par and consists of a fragment of fibroadipose tissue, measuring 4.5\par x 3.0 x 1.0 cm. The new margin is inked black and the old margin\par is inked orange. The specimen is serially sectioned and submitted\par entirely. (3 blocks)\par \par 5. The specimen is received fresh, labeled "right breast lateral\par margin stitch marks new lateral margin" and consists of a\par fragment of fibroadipose tissue, measuring 4 x 3 x 1 cm. The new\par margin is inked red and the old margin is inked black. The\par specimen is serially sectioned and submitted entirely. (3 blocks)\par \par 6. The specimen is received fresh, labeled "right breast axillary\par node #1" and consists of a fragment of yellow fibroadipose\par tissue, measuring 2 x 1.5 x 1.0 cm. One lymph node is identified.\par The specimen is submitted entirely.\par \par Summary of Sections:\par \par \par \par \par \par WISAM GIPSON                        5\par \par \par \par Surgical Final Report\par \par \par \par \par 6A - One lymph node -1\par 6B - Remaining fibrofatty tissue -1\par \par Total blocks - 2\par \par Specimen was received and underwent gross examination at [facility], [address] [place] [address].\par \par 07/29/20 07:28 ia\par \par Perioperative Diagnosis\par Right breast cancer\par \par [facility] Accession Number : [id number]\par \par WISAM GIPSON                        1\par \par \par \par Surgical Final Report\par \par \par \par \par Final Diagnosis\par Breast, right retroareolar mass superior margin, re-excision:\par - Benign atrophic fatty breast tissue with post surgical site\par changes.\par Negative for carcinoma.\par \par Verified by: Faheem Dillon M.D.\par (Electronic Signature)\par Reported on: 08/24/20 14:57 EDT, [address] [place] [id number]\par Phone: [phone]   Fax: [phone]\par _________________________________________________________________\par \par Clinical History\par Right breast mass re-excision\par COVID (-)\par \par Specimen(s) Submitted\par Right breast superior margin\par Time obtained: 8:01 am\par \par Gross Description\par The specimen is received fresh, labeled "right breast superior\par margin, stitch new superior margin" and consists of a fragment of\par tan yellow lobulated adipose tissue, weighing 5 gm and measuring\par 4 x 2.5 x 1 cm.  The sutured new margin is inked red.  The\par opposite side is inked black. The cut surface appears uniform\par yellow.  The specimen is submitted entirely. (6 blocks)\par \par Specimen was received and underwent gross examination at [facility], [address] [place] [address].\par \par 08/24/20 06:44 mt\par \par Perioperative Diagnosis\par Right breast cancer, positive superior margin\par

## 2020-09-03 NOTE — HISTORY OF PRESENT ILLNESS
[FreeTextEntry1] : Dariana is a 79 F who presents with a right breast cancer, s/p R WLE and SLN Bx on 2020, s/p R breast re-excision on 2020, for a [id number], ER/CT pos, Her 2 NEG, anatomic stage IIA breast cancer. \par \par 2020 -- R WLE and SLN Bx \par -retroareolar mass: IDC, SBR 2, T=1.1cm, +DCIS; positive SUPERIOR margin \par -/ SLN pos for metastatic disease \par -neg additional anterior, medial, posterior, lateral margins \par \par 2020 -- R WLE of superior margin \par -benign breast tissue \par \par Her work up was as follows: \par 2020 -- b/l dx tomosynthesis and R breast US \par -scattered areas of fibroglandular densities\par -R: superior breast, anterior depth, 1.5 cm high density superficial mass\par -no suspicious microcalcifications b/l \par -no discrete mass in L breast \par R US \par -@12N3-4, highly vascular irregular hypoechoic mass, measuring 1.4 x 1 x 1.5 cm; correlates with mammo findings \par -no lymphadenopathy \par BIRADS 5 \par \par 2020 -- R US CNBx @12N3-4\par -IDC, moderately to poorly differentiated\par -suspicious for LVI \par -ER/CT positive, Her 2 neg\par -Ki 67 15%\par \par She first palpated a right breast mass 1 month prior.  She thinks that it has increased in size since she first felt it.  She is a little sore since her biopsy, but she did develop a hematoma afterwards.  She denies any left sided breast complaints and denies any nipple discharge or retraction. \par \par Of note, she underwent a knee replacement surgery several years prior.  At that time, she had un-diagnosed CHF and was subsequently found to have a diseased aortic valve as well as coronary artery disease.  She subsequently underwent a CABG and aortic valve tissue based replacement.  Since her surgery, she has not had any chest pains/angina, or shortness of breath.  She has had several episodes of TIA, which has been attributed to her heart disease.  She has not been found to have any carotid artery disease, however.  She is following up with her cardiologist next week to decide if she will need to continue both the plavix and aspirin.   \par \par HISTORICAL RISK FACTORS: \par -1 prior breast biopsy as above, no prior breast surgeries \par -no family history of breast or ovarian cancer \par -, age at first live birth was 37 \par -no prior OCP use \par -no gyn surgeries\par \par INTERVAL HISTORY:\par Dariana returns for her FIRST POST OP VISIT, s/p R WLE and SLN bx on 2020 and a R breast re-excision on 2020.\par \par She is healing well from her recent surgery.  She denies any pain, fevers, chills, drainage, or redness. \par \par Her final pathology revealed a 1.1 cm IDC,with DCIS, eventual negative margins, and  SLN positive for metastatic disease. An oncotype was sent but is still pending, so she has not yet met with medical oncology or radiation oncology.

## 2020-09-03 NOTE — REVIEW OF SYSTEMS
[Skin Wound] : skin wound [As Noted in HPI] : as noted in HPI [Negative] : Heme/Lymph [Fever] : no fever [Chills] : no chills [Chest Pain] : no chest pain [Palpitations] : no palpitations [Abn Vaginal Bleeding] : no unexplained vaginal bleeding [Breast Pain] : no breast pain [Skin Lesions] : no skin lesions [Breast Lump] : no breast lump [Hot Flashes] : no hot flashes

## 2020-09-03 NOTE — ASSESSMENT
[FreeTextEntry1] : Dariana is a 79 F who presents with a right breast cancer, s/p R WLE and SLN Bx on 7/28/2020, s/p R breast re-excision on 8/21/2020, for a [id number], ER/AK pos, Her 2 NEG, anatomic stage IIA breast cancer. \par \par 7/28/2020 -- R WLE and SLN Bx \par -retroareolar mass: IDC, SBR 2, T=1.1cm, +DCIS; positive SUPERIOR margin \par -1/1 SLN pos for metastatic disease \par -neg additional anterior, medial, posterior, lateral margins \par \par 8/24/2020 -- R WLE of superior margin \par -benign breast tissue \par \par On exam, she is healing well from her recent surgery.   There was no signs of infection or hematoma formation, but she may have a seroma in the superior breast.  \par \par Her most recent imaging was a b/l dx mammogram and R breast US on 6/9/2020 which revealed in her RIGHT breast @12N3-4, a 1.4 x 1 x 1.5 cm mass which was her biopsy proven IDC. \par \par Her final pathology revealed a 1.1 cm IDC, ER/AK pos, Her 2 neg, SBR 2, with 1/1 SLN positive for metastatic disease without extracapsular extension.  She initially had a positive superior margin, however on re-excision, no further areas of disease were noted.  \par \par She will be scheduled for a R dx mammogram and US in 6 months.  I will have her followup with me after for a CBE. \par \par An Oncotype was sent even though her LN was positive, per verbal discussion with medical oncology.  Once this is resulted, she will be scheduled to meet with medical oncology for further discussion of chemotherapy/endocrine therapy, and radiation oncology for evaluation of R WBI.  \par \par AS REVIEW: \par In regards to her RIGHT sided breast cancer, she is a great candidate for either breast conservation surgery or a mastectomy.  Her lesion is located @12N3-4   based off the US so she is a candidate for a nipple sparing mastectomy.  However, there is no difference in survival between a lumpectomy + radiation therapy and a mastectomy.  However the rate of local recurrence is slightly higher with the lumpectomy. The rate of recurrence with a mastectomy is not zero, however, and is likely closer to 4-9%.  \par \par In regards to systemic therapy, we briefly discussed both chemotherapy and endocrine therapy. If the tumor is larger than 1 cm and her 2 negative, we would likely need to send an additional study on her tumor sample, called an oncotype DX to make the determination regarding if chemotherapy would help her.  At the completion of all these treatments, she should get endocrine therapy, at current time she would need an AI, for a total of at least 5 years.  \par \par Per ASBrS consensus guidelines, any patient with a diagnosis of breast cancer should be considered for panel genetic testing, as 10% of patients were found to have a mutation on panel testing.  This was offered to the patient today as she does have two daughters and she has agreed to panel genetic testing.  INVITAE panel genetic testing was performed which revealed a VUS in SDHB.  THese results were discussed with Dariana and no additional testing or screening are indicated for VUS.  \par \par All of her questions were answered.  She knows to call with any further questions or concerns.  \par I have spoken with her daughter, Autumn Torres ([phone]) and discussed the results with her as well. \par \par PLAN: \par -awaiting ONCOTYPE results, once resulted, will schedule to see med onc and rad onc\par -sozo \par -INVITAE panel genetic testing --VUS in SDHB\par -R dx mammogram and US in 6 months \par -f/up after

## 2020-09-03 NOTE — PHYSICAL EXAM
[Normocephalic] : normocephalic [EOMI] : extra ocular movement intact [Atraumatic] : atraumatic [No Supraclavicular Adenopathy] : no supraclavicular adenopathy [Examined in the supine and seated position] : examined in the supine and seated position [No Cervical Adenopathy] : no cervical adenopathy [Symmetrical] : symmetrical [No dominant masses] : no dominant masses left breast [No Nipple Retraction] : no left nipple retraction [No Nipple Discharge] : no right nipple discharge [No Axillary Lymphadenopathy] : no right axillary lymphadenopathy [Not Tender] : non-tender [Soft] : abdomen soft [No Edema] : no edema [No Rashes] : no rashes [No Ulceration] : no ulceration [de-identified] : in her superior breast @12N3-4, she has a likely seroma without any signs of infection, erythema or hematoma; otherwise surgical incision is healing well with dermabond still in place [de-identified] : no suspicious abnormalities noted on exam

## 2020-09-03 NOTE — PAST MEDICAL HISTORY
[Menarche Age ____] : age at menarche was [unfilled] [Menopause Age____] : age at menopause was [unfilled] [Total Preg ___] : G[unfilled] [Live Births ___] : P[unfilled]  [Age At Live Birth ___] : Age at live birth: [unfilled] [History of Hormone Replacement Treatment] : has no history of hormone replacement treatment [FreeTextEntry5] : denies  [FreeTextEntry6] : denies [FreeTextEntry8] : yes x 1 month  [FreeTextEntry7] : yes in past x 1 month, stopped in her 30s

## 2020-09-30 ENCOUNTER — APPOINTMENT (OUTPATIENT)
Dept: RADIATION ONCOLOGY | Facility: HOSPITAL | Age: 80
End: 2020-09-30
Payer: MEDICARE

## 2020-09-30 VITALS
SYSTOLIC BLOOD PRESSURE: 150 MMHG | HEART RATE: 64 BPM | DIASTOLIC BLOOD PRESSURE: 67 MMHG | RESPIRATION RATE: 12 BRPM | WEIGHT: 119 LBS | OXYGEN SATURATION: 95 % | HEIGHT: 61 IN | TEMPERATURE: 97.3 F | BODY MASS INDEX: 22.47 KG/M2

## 2020-09-30 PROCEDURE — 99205 OFFICE O/P NEW HI 60 MIN: CPT

## 2020-09-30 RX ORDER — AMLODIPINE BESYLATE 5 MG/1
5 TABLET ORAL
Refills: 0 | Status: ACTIVE | COMMUNITY

## 2020-09-30 NOTE — REVIEW OF SYSTEMS
[Fatigue] : fatigue [Negative] : Psychiatric [Patient Intake Form Reviewed] : Patient intake form was reviewed [Abdominal Pain] : no abdominal pain

## 2020-09-30 NOTE — LETTER CLOSING
[Consult Closing:] : Thank you for allowing me to participate in the care of this patient.  If you have any questions, please do not hesitate to contact me. [Sincerely yours,] : Sincerely yours, [FreeTextEntry3] : April Mcclelland M.D. \par \par Electronically proofread and signed by:  April Mcclelland MD\par Attending, Department of Radiation Medicine\par [facility]\par \par CC: Dr. Mitchell

## 2020-09-30 NOTE — VITALS
[Date: ____________] : Patient's last distress assessment performed on [unfilled]. [0 - No Distress] : Distress Level: 0 [80: Normal activity with effort; some signs or symptoms of disease.] : 80: Normal activity with effort; some signs or symptoms of disease.

## 2020-09-30 NOTE — DISEASE MANAGEMENT
[Pathological] : TNM Stage: p [IA] : IA [FreeTextEntry4] : invasive ductal carcinoma of the right breast, G2, ER/N[place] + / HER2 neg [TTNM] : 1c [NTNM] : 1a [MTNM] : 0 [de-identified] : right breast

## 2020-09-30 NOTE — PHYSICAL EXAM
[Heart Rate And Rhythm] : heart rate and rhythm were normal [Heart Sounds] : normal S1 and S2 [Murmurs] : no murmurs present [Edema] : no peripheral edema present [] : no rash [Sclera] : the sclera and conjunctiva were normal [Normal] : no focal deficits [de-identified] : s/p CABG/ SAVR (2016) - sternotomy scar healed well - [de-identified] : She is examined in both the upright and supine positions.  The right breast has a well healed scar with some soft tissue fibrosis in the surgical bed.  The left breast is unremarkable.  No palpable mass in either breast. [de-identified] : There are some patches of vitiligo in bilateral supraclavicular regions.

## 2020-09-30 NOTE — HISTORY OF PRESENT ILLNESS
[FreeTextEntry1] : \par The patient is a 79 year  old woman who was complaining of a palpable concern in the right breast in July to her PCP Dr. Washburn who then sent pt for imaging.  Diagnostic mammogram and ultrasound on 6/9/2020 showed on the right breast 12:00 axis, 3 to 4 cm from the nipple, there is a highly vascular irregular hypoechoic mass measuring 14 x 10 x 15 mm. There is no right axillary lymphadenopathy. Recommendation: Ultrasound-guided biopsy. BI-RADS Category 5: Highly Suggestive of Malignancy \par \par On 6/17/2020, she had a biopsy of the right breast abnormality at 12 o’clock and pathology showed invasive ductal carcinoma, moderately to poorly differentiated with focal signet ring cell features and a large geographic area of coagulative tumoral necrosis, suspicious for lymphovascular invasion.   It was ER/NC positive /HER 2 negative,  Ki-67 index was 15%.  \par \par She also had an MRI of bilateral breasts on 6/30/2020, which showed:  Impression:  Area biopsy-proven malignancy with biopsy clip and surrounding hematoma at the superior aspect of the right breast. No additional areas of abnormal enhancement. No MRI evidence of malignancy in the left breast. \par  \par On 7/28/2020, she underwent a right lumpectomy and sentinel node biopsy.  She was found to have a 1.1cm  invasive carcinoma, negative EIC. Superior surgical margin was positive. There was 1/1 positive sentinel lymph node measuring 2.5 mm.  No ZACH.  LVSI could not be determined.\par \par On 8/21/2020, patient underwent re-excision of the right breast superior margin and pathology showed no residual disease. \par \par She has been doing well since surgery.  She is here with her spouse  to discuss radiation with Dr. Mcclelland.\par Med/Onc: Dr. Mitchell - appointment 10/1/2020\par Oncotype:   RS= 13\par \par

## 2020-10-01 ENCOUNTER — APPOINTMENT (OUTPATIENT)
Dept: HEMATOLOGY ONCOLOGY | Facility: CLINIC | Age: 80
End: 2020-10-01
Payer: MEDICARE

## 2020-10-01 ENCOUNTER — OUTPATIENT (OUTPATIENT)
Dept: OUTPATIENT SERVICES | Facility: HOSPITAL | Age: 80
LOS: 1 days | Discharge: HOME | End: 2020-10-01

## 2020-10-01 VITALS
DIASTOLIC BLOOD PRESSURE: 70 MMHG | SYSTOLIC BLOOD PRESSURE: 149 MMHG | BODY MASS INDEX: 22.47 KG/M2 | WEIGHT: 119 LBS | TEMPERATURE: 96.4 F | HEIGHT: 61 IN | HEART RATE: 67 BPM

## 2020-10-01 DIAGNOSIS — Z98.890 OTHER SPECIFIED POSTPROCEDURAL STATES: Chronic | ICD-10-CM

## 2020-10-01 DIAGNOSIS — Z96.652 PRESENCE OF LEFT ARTIFICIAL KNEE JOINT: Chronic | ICD-10-CM

## 2020-10-01 DIAGNOSIS — T82.898A OTHER SPECIFIED COMPLICATION OF VASCULAR PROSTHETIC DEVICES, IMPLANTS AND GRAFTS, INITIAL ENCOUNTER: Chronic | ICD-10-CM

## 2020-10-01 PROCEDURE — 99205 OFFICE O/P NEW HI 60 MIN: CPT

## 2020-10-04 NOTE — HISTORY OF PRESENT ILLNESS
[de-identified] : 79 year old female is referred by Dr. Camp for consultation of adjuvant systemic therapy for newly diagnosed right breast cancer. Dariana had dx mammo and US on 6/9/2020 after she felt a palpable lump in her right breast which showed an irregular hypoechoic mass measuring 14 x 10 x 15 mm in the right breast 12:00 axis, 3 to 4 cm from the nipple. There was no right axillary lymphadenopathy. \par \par On 6/17/2020, she had US guided biopsy of the right breast mass at 12 o’clock. The pathology showed invasive ductal carcinoma, moderately to poorly differentiated with focal signet ring cell features and a large geographic area of coagulative tumoral necrosis, suspicious for lymphovascular invasion. It was ER pos 100%, DE pos 80%, HER 2 negative, Ki-67 index 15%. \par \par On 6/30/2020, she had an MRI of bilateral breasts, which showed biopsy-proven malignancy with biopsy clip and surrounding hematoma at the superior aspect of the right breast. No additional areas of abnormal enhancement. No MRI evidence of malignancy in the left breast. \par  \par On 7/28/2020, she underwent a right lumpectomy and sentinel node biopsy. The pathology revealed 1.1 cm invasive carcinoma, negative EIC. Superior surgical margin was positive. There was 1/1 positive sentinel lymph node measuring 2.5 mm. No ZACH. LVSI could not be determined. The AJCC 8th edition pathologic stage is IIA ([id number]).\par \par On 8/21/2020, patient underwent re-excision of the right breast superior margin and pathology showed no residual disease. \par \par The surgical specimen was sent for Oncotype dx assay. Her RS is 13, predicting 13% risk of distant recurrence at 9 years with endocrine therapy alone.\par \par She has recovered well from surgery. She saw Dr. Mcclelland and was recommended to have adjuvant WBI. \par

## 2020-10-04 NOTE — PHYSICAL EXAM
[Fully active, able to carry on all pre-disease performance without restriction] : Status 0 - Fully active, able to carry on all pre-disease performance without restriction [Normal] : affect appropriate [de-identified] : Status post right breast lumpectomy. The surgical scar is healing well. There is no palpable abnormality.

## 2020-10-04 NOTE — CONSULT LETTER
[Dear  ___] : Dear  [unfilled], [Consult Letter:] : I had the pleasure of evaluating your patient, [unfilled]. [Please see my note below.] : Please see my note below. [Consult Closing:] : Thank you very much for allowing me to participate in the care of this patient.  If you have any questions, please do not hesitate to contact me. [Sincerely,] : Sincerely, [FreeTextEntry3] : Linda Mitchell MD [DrHumaira  ___] : Dr. AMOR [DrHumaira ___] : Dr. AMOR

## 2020-10-04 NOTE — ASSESSMENT
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 \par \par Recommendations:\par A discussion was held with the patient regarding her diagnosis, staging, prognosis and systemic adjuvant therapy options. We discussed indication and choice of the systemic adjuvant therapy including adjuvant chemotherapy followed by adjuvant endocrine therapy or adjuvant endocrine therapy alone. The decision on treatment choice is stratified based on the risk of cancer recurrence, magnitude benefit and potential toxicity from the treatment. Dariana has hormone receptor positive IDC with clinical high risk ( tumor 1.1 cm, G2, positive SLN). Her RS is 13, in the low risk range and predicting less likelihood of benefit from adjuvant chemotherapy.  We discussed using Oncotype recurrence score to predict the benefit of chemotherapy. SWOG 8814 showed that the postmenopausal female with hormone receptor positive breast cancer, 1-3 positive node and low risk RS <18 did not benefit from adjuvant chemotherapy. Considering her age, benefit and side effect from the chemotherapy should be carefully weighed. Since her tumor is strong ER positive, she is expected to have good response to adjuvant endocrine therapy. It is appropriate to forgo chemotherapy. \par \par We discussed adjuvant endocrine therapy with an aromatase inhibitor. Letrozole is recommended. The potential sided effects may include but not limit to muscular and skeletal symptoms, arthralgia, increasing osteopenia and osteoporosis, a small increased risk of cardiovascular events and slight increase of hyperlipidemia. \par \par We discussed bone health management given Letrozole may reduce bone density. She is advised to take calcium and vitamin D supplement. Encourage health life style and regular physical activities. She is given a referral for bone density.\par \par She will followup with Dr. Mcclelland for adjuvant WBI. She may start Letrozole now and continue during radiation. A prescription was sent to her pharmacy.\par \par All questions were answered. She agrees with the plan. \par \par RTO for followup in 3 months. She will call if there are any new concerns.\par

## 2020-10-05 DIAGNOSIS — C50.111 MALIGNANT NEOPLASM OF CENTRAL PORTION OF RIGHT FEMALE BREAST: ICD-10-CM

## 2020-10-05 DIAGNOSIS — Z79.811 LONG TERM (CURRENT) USE OF AROMATASE INHIBITORS: ICD-10-CM

## 2020-10-07 PROCEDURE — 77290 THER RAD SIMULAJ FIELD CPLX: CPT | Mod: 26

## 2020-10-07 PROCEDURE — 77332 RADIATION TREATMENT AID(S): CPT | Mod: 26

## 2020-10-08 PROCEDURE — 77263 THER RADIOLOGY TX PLNG CPLX: CPT

## 2020-10-15 PROCEDURE — 77334 RADIATION TREATMENT AID(S): CPT | Mod: 26

## 2020-10-15 PROCEDURE — 77295 3-D RADIOTHERAPY PLAN: CPT | Mod: 26

## 2020-10-15 PROCEDURE — 77321 SPECIAL TELETX PORT PLAN: CPT | Mod: 26

## 2020-10-21 PROCEDURE — 77427 RADIATION TX MANAGEMENT X5: CPT

## 2020-10-28 PROCEDURE — 77427 RADIATION TX MANAGEMENT X5: CPT

## 2020-10-29 ENCOUNTER — NON-APPOINTMENT (OUTPATIENT)
Age: 80
End: 2020-10-29

## 2020-10-29 VITALS
SYSTOLIC BLOOD PRESSURE: 152 MMHG | DIASTOLIC BLOOD PRESSURE: 70 MMHG | BODY MASS INDEX: 23.05 KG/M2 | RESPIRATION RATE: 12 BRPM | WEIGHT: 122 LBS | OXYGEN SATURATION: 99 % | HEART RATE: 65 BPM | TEMPERATURE: 96 F

## 2020-10-29 NOTE — REVIEW OF SYSTEMS
[Pruritus: Grade 0] : Pruritus: Grade 0 [Skin Hyperpigmentation: Grade 0] : Skin Hyperpigmentation: Grade 0 [Fatigue: Grade 0] : Fatigue: Grade 0 [Dermatitis Radiation: Grade 0] : Dermatitis Radiation: Grade 0

## 2020-10-29 NOTE — HISTORY OF PRESENT ILLNESS
[FreeTextEntry1] : Patient reports doing well.  She has no complaints.  Denies skin changes,  pain and SOB.  Patient is accompanied by her daughter.  \par \par MD Note:  She is doing well.  No breast discomfort.  No new concerns\par

## 2020-10-29 NOTE — DISEASE MANAGEMENT
[Pathological] : TNM Stage: p [IA] : IA [FreeTextEntry4] : invasive ductal carcinoma of the right breast, G2, ER/TX + / HER2 neg [TTNM] : 1c [NTNM] : 1a [MTNM] : 0 [de-identified] : [id number] [de-identified] : 5240cGy [de-identified] : Right breast

## 2020-10-29 NOTE — PHYSICAL EXAM
[Thin] : thin [Hearing Threshold Finger Rub Not [place]] : hearing was normal [Heart Rate And Rhythm] : heart rate and rhythm were normal [Murmurs] : no murmurs present [Arterial Pulses Normal] : the arterial pulses were normal [Edema] : no peripheral edema present [Normal] : oriented to person, place and time, the affect was normal, the mood was normal and not anxious [de-identified] : no hyperpigmentation, minimal erythema on treated breast

## 2020-10-29 NOTE — REASON FOR VISIT
[Routine On-Treatment] : a routine on-treatment visit for [Breast Cancer] : breast cancer [Family Member] : family member

## 2020-11-02 ENCOUNTER — NON-APPOINTMENT (OUTPATIENT)
Age: 80
End: 2020-11-02

## 2020-11-02 VITALS
SYSTOLIC BLOOD PRESSURE: 156 MMHG | BODY MASS INDEX: 23.24 KG/M2 | OXYGEN SATURATION: 98 % | TEMPERATURE: 98.2 F | RESPIRATION RATE: 12 BRPM | DIASTOLIC BLOOD PRESSURE: 70 MMHG | WEIGHT: 123 LBS | HEART RATE: 60 BPM

## 2020-11-02 NOTE — PHYSICAL EXAM
[Sclera] : the sclera and conjunctiva were normal [Breast Abnormal Lactation (Galactorrhea)] : no nipple discharge [No UE Edema] : there is no upper extremity edema [Nail Clubbing] : no clubbing  or cyanosis of the fingernails [Normal] : oriented to person, place and time, the affect was normal, the mood was normal and not anxious [de-identified] : right breast sensitive to touch- mild erythema, no desquamation

## 2020-11-02 NOTE — REVIEW OF SYSTEMS
[Fatigue: Grade 1 - Fatigue relieved by rest] : Fatigue: Grade 1 - Fatigue relieved by rest [Dermatitis Radiation: Grade 0] : Dermatitis Radiation: Grade 0

## 2020-11-02 NOTE — HISTORY OF PRESENT ILLNESS
[FreeTextEntry1] : Patient reports doing well.  However, she is experiencing hot flashes, fatigue, sleepiness and headaches which have just recently  started while on letrozole.  Also, mentions right breast sensitivity.  Otherwise, no other complaints.  She is accompanied by her . \par \par MD Note:  She is doing well.  Mild breast discomfort.  She has some side effects associated with endocrine therapy.  No new concerns\par

## 2020-11-02 NOTE — DISEASE MANAGEMENT
[Pathological] : TNM Stage: p [IA] : IA [FreeTextEntry4] : invasive ductal carcinoma of the right breast, G2, ER/AL + / HER2 neg [TTNM] : 1c [NTNM] : 1a [MTNM] : 0 [de-identified] : [id number] [de-identified] : 5240cGy [de-identified] : Right breast

## 2020-11-04 PROCEDURE — 77427 RADIATION TX MANAGEMENT X5: CPT

## 2020-11-09 ENCOUNTER — NON-APPOINTMENT (OUTPATIENT)
Age: 80
End: 2020-11-09

## 2020-11-09 VITALS
TEMPERATURE: 98.6 F | WEIGHT: 124.4 LBS | HEART RATE: 65 BPM | DIASTOLIC BLOOD PRESSURE: 63 MMHG | RESPIRATION RATE: 12 BRPM | SYSTOLIC BLOOD PRESSURE: 135 MMHG | BODY MASS INDEX: 23.51 KG/M2

## 2020-11-09 NOTE — PHYSICAL EXAM
[Normal] : oriented to person, place and time, the affect was normal, the mood was normal and not anxious [de-identified] : mild breast erythema.  No desquamation.

## 2020-11-09 NOTE — REVIEW OF SYSTEMS
[Fatigue: Grade 0] : Fatigue: Grade 0 [Dermatitis Radiation: Grade 1 - Faint erythema or dry desquamation] : Dermatitis Radiation: Grade 1 - Faint erythema or dry desquamation

## 2020-11-09 NOTE — HISTORY OF PRESENT ILLNESS
[FreeTextEntry1] : Patient reports doing well. Otherwise, she has  no complaints.  Denies skin issues.

## 2020-11-09 NOTE — DISEASE MANAGEMENT
[Pathological] : TNM Stage: p [IA] : IA [FreeTextEntry4] : invasive ductal carcinoma of the right breast, G2, ER/MN + / HER2 neg [TTNM] : 1c [NTNM] : 1a [MTNM] : 0 [de-identified] : [id number] [de-identified] : 5240cGy [de-identified] : Right breast

## 2020-11-11 PROCEDURE — 77427 RADIATION TX MANAGEMENT X5: CPT

## 2020-11-11 PROCEDURE — 77280 THER RAD SIMULAJ FIELD SMPL: CPT | Mod: 26

## 2020-11-16 ENCOUNTER — NON-APPOINTMENT (OUTPATIENT)
Age: 80
End: 2020-11-16

## 2020-11-16 VITALS
RESPIRATION RATE: 16 BRPM | WEIGHT: 126.38 LBS | HEART RATE: 73 BPM | SYSTOLIC BLOOD PRESSURE: 137 MMHG | BODY MASS INDEX: 23.88 KG/M2 | DIASTOLIC BLOOD PRESSURE: 70 MMHG | TEMPERATURE: 97.9 F | OXYGEN SATURATION: 99 %

## 2020-11-16 NOTE — HISTORY OF PRESENT ILLNESS
[FreeTextEntry1] : 11/16/2020 Nursing: Pt states she has occasional right breast tenderness. No other complaints. No skin reaction noted. Applying Eucerin at night.\par \par MD Note:  She is doing well.  Mild breast discomfort.  No new concerns\par

## 2020-11-16 NOTE — PHYSICAL EXAM
[] : no respiratory distress [Exaggerated Use Of Accessory Muscles For Inspiration] : no accessory muscle use [Normal] : oriented to person, place and time, the affect was normal, the mood was normal and not anxious [de-identified] : Mild erythema in radiation field.  No desquamation

## 2020-11-16 NOTE — DISEASE MANAGEMENT
[Pathological] : TNM Stage: p [IA] : IA [FreeTextEntry4] : invasive ductal carcinoma of the right breast, G2, ER/PA + / HER2 neg [TTNM] : 1c [NTNM] : 1a [MTNM] : 0 [de-identified] : 4990cGy [de-identified] : 5240cGy [de-identified] : Right breast

## 2020-11-17 ENCOUNTER — OUTPATIENT (OUTPATIENT)
Dept: OUTPATIENT SERVICES | Facility: HOSPITAL | Age: 80
LOS: 1 days | Discharge: HOME | End: 2020-11-17
Payer: MEDICARE

## 2020-11-17 DIAGNOSIS — C50.111 MALIGNANT NEOPLASM OF CENTRAL PORTION OF RIGHT FEMALE BREAST: ICD-10-CM

## 2020-11-17 DIAGNOSIS — Z98.890 OTHER SPECIFIED POSTPROCEDURAL STATES: Chronic | ICD-10-CM

## 2020-11-17 DIAGNOSIS — T82.898A OTHER SPECIFIED COMPLICATION OF VASCULAR PROSTHETIC DEVICES, IMPLANTS AND GRAFTS, INITIAL ENCOUNTER: Chronic | ICD-10-CM

## 2020-11-17 DIAGNOSIS — Z96.652 PRESENCE OF LEFT ARTIFICIAL KNEE JOINT: Chronic | ICD-10-CM

## 2020-12-31 ENCOUNTER — APPOINTMENT (OUTPATIENT)
Dept: RADIATION ONCOLOGY | Facility: HOSPITAL | Age: 80
End: 2020-12-31
Payer: MEDICARE

## 2021-01-07 ENCOUNTER — APPOINTMENT (OUTPATIENT)
Dept: HEMATOLOGY ONCOLOGY | Facility: CLINIC | Age: 81
End: 2021-01-07
Payer: MEDICARE

## 2021-01-07 DIAGNOSIS — Z79.811 LONG TERM (CURRENT) USE OF AROMATASE INHIBITORS: ICD-10-CM

## 2021-01-07 PROCEDURE — 99443: CPT | Mod: 95

## 2021-01-13 ENCOUNTER — APPOINTMENT (OUTPATIENT)
Dept: RADIATION ONCOLOGY | Facility: HOSPITAL | Age: 81
End: 2021-01-13
Payer: MEDICARE

## 2021-01-13 NOTE — DISEASE MANAGEMENT
[Pathological] : TNM Stage: p [FreeTextEntry4] : invasive ductal carcinoma of the right breast, G2, ER/ND + / HER2 neg [TTNM] : 1c [NTNM] : 1a [MTNM] : 0 [IA] : IA [de-identified] : 5240cGy [de-identified] : 5240cGy [de-identified] : Right breast

## 2021-01-17 PROBLEM — Z79.811 USE OF AROMATASE INHIBITORS: Status: ACTIVE | Noted: 2020-10-04

## 2021-01-17 NOTE — REASON FOR VISIT
[Follow-Up Visit] : a follow-up [Initial Consultation] : an initial consultation [FreeTextEntry2] : right breast cancer.

## 2021-01-17 NOTE — ASSESSMENT
[FreeTextEntry1] : 78 yo female has stage IIA ([id number]) IDC of the right breast, G2, ER/RI positive, her-2 negative, s/p right lumpectomy and SLNB, with negative margins.\par Oncotype RS 13, in the low risk range. \par \par Recommendations:\par -- Continue adjuvant endocrine therapy with Letrozole.\par -- Calcium and vitamin D supplement.\par -- Reminded to have bone density. Will give additional recommendation if needed based on the results. \par -- right breast dx mammo and US in 1/2021.\par -- Followup with Dr. Camp and Dr. Mcclelland as directed.\par -- RTO for followup in 6 months.\par \par

## 2021-01-17 NOTE — HISTORY OF PRESENT ILLNESS
[de-identified] : 79 year old female is referred by Dr. Camp for consultation of adjuvant systemic therapy for newly diagnosed right breast cancer. Dariana had dx mammo and US on 6/9/2020 after she felt a palpable lump in her right breast which showed an irregular hypoechoic mass measuring 14 x 10 x 15 mm in the right breast 12:00 axis, 3 to 4 cm from the nipple. There was no right axillary lymphadenopathy. \par \par On 6/17/2020, she had US guided biopsy of the right breast mass at 12 o’clock. The pathology showed invasive ductal carcinoma, moderately to poorly differentiated with focal signet ring cell features and a large geographic area of coagulative tumoral necrosis, suspicious for lymphovascular invasion. It was ER pos 100%, VA pos 80%, HER 2 negative, Ki-67 index 15%. \par \par On 6/30/2020, she had an MRI of bilateral breasts, which showed biopsy-proven malignancy with biopsy clip and surrounding hematoma at the superior aspect of the right breast. No additional areas of abnormal enhancement. No MRI evidence of malignancy in the left breast. \par  \par On 7/28/2020, she underwent a right lumpectomy and sentinel node biopsy. The pathology revealed 1.1 cm invasive carcinoma, negative EIC. Superior surgical margin was positive. There was 1/1 positive sentinel lymph node measuring 2.5 mm. No ZACH. LVSI could not be determined. The AJCC 8th edition pathologic stage is IIA ([id number]).\par \par On 8/21/2020, patient underwent re-excision of the right breast superior margin and pathology showed no residual disease. \par \par The surgical specimen was sent for Oncotype dx assay. Her RS is 13, predicting 13% risk of distant recurrence at 9 years with endocrine therapy alone.\par \par She has recovered well from surgery. She saw Dr. Mcclelland and was recommended to have adjuvant WBI. \par  [de-identified] : 1/7/2021:\par The patient is evaluated via telehealth with phone only. She has stage IIA ([id number]) IDC of the right breast, G2, ER/IN positive, her-2 negative, s/p right lumpectomy and SLNB, with negative margins. Oncotype RS is 13, in the low risk range. She saw Dr. Mcclelland and completed adjuvant whole breast radiation on 11/17/2020. She started on adjuvant endocrine therapy with Letrozole and tolerated well so far. She has some mild aches and pains which are bearable. She was given a referral for bone density but it was not done yet.

## 2021-01-17 NOTE — PHYSICAL EXAM
[Fully active, able to carry on all pre-disease performance without restriction] : Status 0 - Fully active, able to carry on all pre-disease performance without restriction [Normal] : well developed, well nourished, in no acute distress [de-identified] : physical exam deferred (telehealth),

## 2021-02-04 ENCOUNTER — APPOINTMENT (OUTPATIENT)
Dept: RADIATION ONCOLOGY | Facility: HOSPITAL | Age: 81
End: 2021-02-04
Payer: MEDICARE

## 2021-02-04 VITALS
BODY MASS INDEX: 23.88 KG/M2 | RESPIRATION RATE: 12 BRPM | OXYGEN SATURATION: 98 % | HEART RATE: 77 BPM | TEMPERATURE: 97.5 F | SYSTOLIC BLOOD PRESSURE: 136 MMHG | WEIGHT: 126.38 LBS | DIASTOLIC BLOOD PRESSURE: 68 MMHG

## 2021-02-04 PROCEDURE — 99024 POSTOP FOLLOW-UP VISIT: CPT

## 2021-02-04 NOTE — LETTER CLOSING
[Consult Closing:] : Thank you for allowing me to participate in the care of this patient.  If you have any questions, please do not hesitate to contact me. [Sincerely yours,] : Sincerely yours, [FreeTextEntry3] : April Mcclelland M.D. \par \par Electronically proofread and signed by:  April Mcclelland MD\par Attending, Department of Radiation Medicine\par [facility]\par \par CC: Dr. Mitchell

## 2021-02-04 NOTE — DISEASE MANAGEMENT
[FreeTextEntry4] : invasive ductal carcinoma of the right breast, G2, ER/NV + / HER2 neg [TTNM] : 1c [NTNM] : 1a [MTNM] : 0 [de-identified] : Right breast

## 2021-02-04 NOTE — HISTORY OF PRESENT ILLNESS
[FreeTextEntry1] : \par WISAM GIPSON returns to clinic in follow up visit.  As you know, WISAM GIPSON is an 80 year old female with invasive ductal carcinoma of the right breast, G2, ER/DC + / HER2 neg, [id number], Stage IA. She is s/p breast conserving surgery.  The patient received 5240 cGy to the right breast from 10/21/2020 through 11/17/2020.  I last saw her in November.    In the interim, the patient reports doing well.  She continues to do skin care, stating her skin looks good, not fatigued, just dealing with COVID isolation , she recently had her 1st vaccine for COVID.   She is on endocrine therapy and tolerating it well..  Seen by Dr. Mitchell 1/7/2021, pt expressed she does not want to do the bone density scan .  Otherwise, she is well and has no complaints.  \par \par \par Upcoming appointments: Dr. Camp 3/10/202, Dr. Mitchell 4/8/2021\par \par \par

## 2021-02-04 NOTE — PHYSICAL EXAM
[Hearing Threshold Finger Rub Not [place]] : hearing was normal [Heart Rate And Rhythm] : heart rate and rhythm were normal [Heart Sounds] : normal S1 and S2 [Murmurs] : no murmurs present [Edema] : no peripheral edema present [Breast Palpation Mass] : no palpable masses [Breast Abnormal Lactation (Galactorrhea)] : no nipple discharge [No UE Edema] : there is no upper extremity edema [Abdomen Soft] : soft [Cervical Lymph Nodes Enlarged Posterior Bilaterally] : posterior cervical [Abdomen Tenderness] : non-tender [Supraclavicular Lymph Nodes Enlarged Bilaterally] : supraclavicular [Cervical Lymph Nodes Enlarged Anterior Bilaterally] : anterior cervical [Axillary Lymph Nodes Enlarged Bilaterally] : axillary [Nail Clubbing] : no clubbing  or cyanosis of the fingernails [Musculoskeletal - Swelling] : no joint swelling [No Focal Deficits] : no focal deficits [Normal] : oriented to person, place and time, the affect was normal, the mood was normal and not anxious [de-identified] : She is examined in both the upright and supine positions.  The right breast has mild residual erythema centrally  around areola.    The left breast is unremarkable.  No palpable mass in either breast.

## 2021-03-01 ENCOUNTER — RESULT REVIEW (OUTPATIENT)
Age: 81
End: 2021-03-01

## 2021-03-01 ENCOUNTER — OUTPATIENT (OUTPATIENT)
Dept: OUTPATIENT SERVICES | Facility: HOSPITAL | Age: 81
LOS: 1 days | Discharge: HOME | End: 2021-03-01
Payer: MEDICARE

## 2021-03-01 DIAGNOSIS — R92.8 OTHER ABNORMAL AND INCONCLUSIVE FINDINGS ON DIAGNOSTIC IMAGING OF BREAST: ICD-10-CM

## 2021-03-01 DIAGNOSIS — T82.898A OTHER SPECIFIED COMPLICATION OF VASCULAR PROSTHETIC DEVICES, IMPLANTS AND GRAFTS, INITIAL ENCOUNTER: Chronic | ICD-10-CM

## 2021-03-01 DIAGNOSIS — Z96.652 PRESENCE OF LEFT ARTIFICIAL KNEE JOINT: Chronic | ICD-10-CM

## 2021-03-01 DIAGNOSIS — Z98.890 OTHER SPECIFIED POSTPROCEDURAL STATES: Chronic | ICD-10-CM

## 2021-03-01 PROCEDURE — G0279: CPT | Mod: 26

## 2021-03-01 PROCEDURE — 77065 DX MAMMO INCL CAD UNI: CPT | Mod: 26,RT

## 2021-03-01 PROCEDURE — 76642 ULTRASOUND BREAST LIMITED: CPT | Mod: 26,RT

## 2021-04-01 ENCOUNTER — APPOINTMENT (OUTPATIENT)
Dept: BREAST CENTER | Facility: CLINIC | Age: 81
End: 2021-04-01
Payer: MEDICARE

## 2021-04-01 VITALS
SYSTOLIC BLOOD PRESSURE: 126 MMHG | HEIGHT: 61 IN | DIASTOLIC BLOOD PRESSURE: 80 MMHG | BODY MASS INDEX: 24.17 KG/M2 | WEIGHT: 128 LBS | TEMPERATURE: 97.4 F

## 2021-04-01 PROCEDURE — 99213 OFFICE O/P EST LOW 20 MIN: CPT

## 2021-04-01 NOTE — REVIEW OF SYSTEMS
[Fever] : no fever [Chills] : no chills [Chest Pain] : no chest pain [Palpitations] : no palpitations [Abn Vaginal Bleeding] : no unexplained vaginal bleeding [Skin Lesions] : no skin lesions [Skin Wound] : no skin wound [Breast Pain] : no breast pain [Breast Lump] : no breast lump [Hot Flashes] : no hot flashes

## 2021-04-01 NOTE — HISTORY OF PRESENT ILLNESS
[FreeTextEntry1] : Dariana is a 80 F who presents with a right breast cancer, s/p R WLE and SLN Bx on 2020, s/p R breast re-excision on 2020, for a [id number], ER/[place] pos, Her 2 NEG, anatomic stage IIA breast cancer. \par \par 2020 -- R WLE and SLN Bx \par -retroareolar mass: IDC, SBR 2, T=1.1cm, +DCIS; positive SUPERIOR margin \par - SLN pos for metastatic disease \par -neg additional anterior, medial, posterior, lateral margins \par \par 2020 -- R WLE of superior margin \par -benign breast tissue \par \par 10/21/2020-2020 -- s/p R WBI (5240 gy)\par \par -on letrozole since 10/1/2020\par \par Her work up was as follows: \par 2020 -- b/l dx tomosynthesis and R breast US \par -scattered areas of fibroglandular densities\par -R: superior breast, anterior depth, 1.5 cm high density superficial mass\par -no suspicious microcalcifications b/l \par -no discrete mass in L breast \par R US \par -@12N3-4, highly vascular irregular hypoechoic mass, measuring 1.4 x 1 x 1.5 cm; correlates with mammo findings \par -no lymphadenopathy \par BIRADS 5 \par \par 2020 -- R US CNBx @12N3-4\par -IDC, moderately to poorly differentiated\par -suspicious for LVI \par -ER/[place] positive, Her 2 neg\par -Ki 67 15%\par \par She first palpated a right breast mass 1 month prior.  She thinks that it has increased in size since she first felt it.  She is a little sore since her biopsy, but she did develop a hematoma afterwards.  She denies any left sided breast complaints and denies any nipple discharge or retraction. \par \par Of note, she underwent a knee replacement surgery several years prior.  At that time, she had un-diagnosed CHF and was subsequently found to have a diseased aortic valve as well as coronary artery disease.  She subsequently underwent a CABG and aortic valve tissue based replacement.  Since her surgery, she has not had any chest pains/angina, or shortness of breath.  She has had several episodes of TIA, which has been attributed to her heart disease.  She has not been found to have any carotid artery disease, however.  She is following up with her cardiologist next week to decide if she will need to continue both the plavix and aspirin.   \par \par HISTORICAL RISK FACTORS: \par -1 prior breast biopsy as above, no prior breast surgeries \par -no family history of breast or ovarian cancer \par -, age at first live birth was 37 \par -no prior OCP use \par -no gyn surgeries\par \par INTERVAL HISTORY:\monique Westbrook returns for her FIRST POST OP VISIT, s/p R WLE and SLN bx on 2020 and a R breast re-excision on 2020.\par \par She is healing well from her recent surgery.  She denies any pain, fevers, chills, drainage, or redness. \par \par Her final pathology revealed a 1.1 cm IDC,with DCIS, eventual negative margins, and / SLN positive for metastatic disease. An oncotype was13, so no chemotherapy was recommended.  \par \par 2021 -- \monique Westbrook returns for a 6 month follow up visit.  \par \par Since her last visit, she has since undergone R WBI and has been taking letrozole since 10/2020. \par \par She has no breast related complaints at this time.  She denies any breast pain, has not palpated any new palpable masses in either breast and denies any nipple discharge or retraction.  \par \par She does not want to get the bone mineral density scan.  But she has no bony pains or hot flashes. \par \par Her most recent imaging was performed on 3/1/2021, a R dx mammogram and US, which revealed post surgical distortion at her lumpectomy site in her superior R breast, deemed BIRADS 2.

## 2021-04-01 NOTE — PHYSICAL EXAM
[de-identified] : in her superior breast @12N3-4, there was no suspicious abnormalities palpated within this area, she does have some scar retraction and post radiation hyperpigmentation  [de-identified] : no suspicious abnormalities noted on exam

## 2021-04-01 NOTE — DATA REVIEWED
[FreeTextEntry1] : EXAM:  MG US BREAST LIMITED RT\par EXAM:  MG MAMMO DIAG W RICARDO RT#\par \par \par PROCEDURE DATE:  03/01/2021\par \par \par \par INTERPRETATION:  Clinical History / Reason for exam: History of right breast malignancy status post lumpectomy in 2020.\par \par The patient reports her last clinical breast examination was performed about   six months ago.\par \par Family history: There is no family history of breast cancer.\par \par Comparisons: Mammograms dating back to 6/9/2020. Breast ultrasound dating back to 2020.\par \par Views obtained: right full field digital 2D and digital tomosynthesis images as well as spot views.\par \par Computer-aided detection was utilized in the interpretation of this examination.\par \par Breast composition: There are scattered areas of fibroglandular density.\par \par Findings:\par \par Mammogram:\par There is postsurgical architectural distortion at the lumpectomy site in superior right breast.\par No suspicious mass, microcalcifications or areas of architectural distortion seen in the right breast.\par \par Ultrasound:\par \par Targeted unilateral right breast ultrasound was performed.\par \par There is area of postsurgical architectural distortion noted at the lumpectomy site 12:00 N3 of the right breast. No new mass or calcifications noted.\par \par Impression: No mammographic or sonographic evidence of malignancy. Postsurgical architectural distortion right breast.\par \par Recommendation: As per post lumpectomy routine, a follow-up bilateral mammogram is recommended.\par \par BI-RADS Category 2: Benign\par \par The above findings and recommendations were discussed with the patient at the time of the examination.\par \par \par \par \par RA ACOSTA DO; Attending Radiologist\par This document has been electronically signed. Mar  1 2021  2:41PM\par \par

## 2021-04-01 NOTE — PAST MEDICAL HISTORY
[History of Hormone Replacement Treatment] : has no history of hormone replacement treatment [FreeTextEntry6] : denies [FreeTextEntry5] : denies  [FreeTextEntry7] : yes in past x 1 month, stopped in her 30s [FreeTextEntry8] : yes x 1 month

## 2021-04-01 NOTE — ASSESSMENT
[FreeTextEntry1] : Dariana is a 80 F who presents with a right breast cancer, s/p R WLE and SLN Bx on 7/28/2020, s/p R breast re-excision on 8/21/2020, for a [id number], ER/OR pos, Her 2 NEG, anatomic stage IIA breast cancer. \par \par 7/28/2020 -- R WLE and SLN Bx \par -retroareolar mass: IDC, SBR 2, T=1.1cm, +DCIS; positive SUPERIOR margin \par -1/1 SLN pos for metastatic disease \par -neg additional anterior, medial, posterior, lateral margins \par \par 8/24/2020 -- R WLE of superior margin \par -benign breast tissue \par \par 10/21/2020-11/17/2020 -- s/p R WBI (5240 gy)\par \par -on letrozole since 10/1/2020\par \par On exam, no suspicious abnormalities were palpated within either breast.  She has post radiation changes and some scar retraction in her right superior breast at her surgical site.  \par \par Her most recent imaging was performed on 3/1/2021, a R dx mammogram and US, which revealed post surgical distortion at her lumpectomy site in her superior R breast, deemed BIRADS 2. \par \par She will be due for a b/l dx mammogram on 6/9/2021.  This will be scheduled for her today.  I will have her follow up after for a CBE. \par \par AS REVIEW: \par Her final pathology revealed a 1.1 cm IDC, ER/OR pos, Her 2 neg, SBR 2, with 1/1 SLN positive for metastatic disease without extracapsular extension.  She initially had a positive superior margin, however on re-excision, no further areas of disease were noted.  \par  \par In regards to her RIGHT sided breast cancer, she is a great candidate for either breast conservation surgery or a mastectomy.  Her lesion is located @12N3-4   based off the US so she is a candidate for a nipple sparing mastectomy.  However, there is no difference in survival between a lumpectomy + radiation therapy and a mastectomy.  However the rate of local recurrence is slightly higher with the lumpectomy. The rate of recurrence with a mastectomy is not zero, however, and is likely closer to 4-9%.  \par \par In regards to systemic therapy, we briefly discussed both chemotherapy and endocrine therapy. If the tumor is larger than 1 cm and her 2 negative, we would likely need to send an additional study on her tumor sample, called an oncotype DX to make the determination regarding if chemotherapy would help her.  At the completion of all these treatments, she should get endocrine therapy, at current time she would need an AI, for a total of at least 5 years.  \par \par Per ASBrS consensus guidelines, any patient with a diagnosis of breast cancer should be considered for panel genetic testing, as 10% of patients were found to have a mutation on panel testing.  This was offered to the patient today as she does have two daughters and she has agreed to panel genetic testing.  [facility]AE panel genetic testing was performed which revealed a VUS in SDHB.  THese results were discussed with Dariana and no additional testing or screening are indicated for VUS.  \par \par All of her questions were answered.  She knows to call with any further questions or concerns.  \par I have spoken with her daughter, Autumn Torres ([phone]) and discussed the results with her as well. \par \par PLAN: \par -sozo \par -INVITAE panel genetic testing --VUS in SDHB\par -R dx mammogram, L screening mammogram due on 6/9/2021  \par -f/up after

## 2021-04-08 ENCOUNTER — APPOINTMENT (OUTPATIENT)
Dept: HEMATOLOGY ONCOLOGY | Facility: CLINIC | Age: 81
End: 2021-04-08

## 2021-06-11 ENCOUNTER — OUTPATIENT (OUTPATIENT)
Dept: OUTPATIENT SERVICES | Facility: HOSPITAL | Age: 81
LOS: 1 days | Discharge: HOME | End: 2021-06-11
Payer: MEDICARE

## 2021-06-11 ENCOUNTER — RESULT REVIEW (OUTPATIENT)
Age: 81
End: 2021-06-11

## 2021-06-11 DIAGNOSIS — R92.8 OTHER ABNORMAL AND INCONCLUSIVE FINDINGS ON DIAGNOSTIC IMAGING OF BREAST: ICD-10-CM

## 2021-06-11 DIAGNOSIS — Z96.652 PRESENCE OF LEFT ARTIFICIAL KNEE JOINT: Chronic | ICD-10-CM

## 2021-06-11 DIAGNOSIS — Z98.890 OTHER SPECIFIED POSTPROCEDURAL STATES: Chronic | ICD-10-CM

## 2021-06-11 DIAGNOSIS — T82.898A OTHER SPECIFIED COMPLICATION OF VASCULAR PROSTHETIC DEVICES, IMPLANTS AND GRAFTS, INITIAL ENCOUNTER: Chronic | ICD-10-CM

## 2021-06-11 PROCEDURE — 77066 DX MAMMO INCL CAD BI: CPT | Mod: 26

## 2021-06-11 PROCEDURE — G0279: CPT | Mod: 26

## 2021-06-14 ENCOUNTER — NON-APPOINTMENT (OUTPATIENT)
Age: 81
End: 2021-06-14

## 2021-06-18 ENCOUNTER — APPOINTMENT (OUTPATIENT)
Dept: BREAST CENTER | Facility: CLINIC | Age: 81
End: 2021-06-18
Payer: MEDICARE

## 2021-06-18 VITALS
BODY MASS INDEX: 24.17 KG/M2 | HEIGHT: 61 IN | DIASTOLIC BLOOD PRESSURE: 80 MMHG | TEMPERATURE: 98.7 F | SYSTOLIC BLOOD PRESSURE: 124 MMHG | WEIGHT: 128 LBS

## 2021-06-18 PROCEDURE — 99213 OFFICE O/P EST LOW 20 MIN: CPT

## 2021-06-18 NOTE — PAST MEDICAL HISTORY
[Menarche Age ____] : age at menarche was [unfilled] [Menopause Age____] : age at menopause was [unfilled] [Total Preg ___] : G[unfilled] [Live Births ___] : P[unfilled]  [Age At Live Birth ___] : Age at live birth: [unfilled] [History of Hormone Replacement Treatment] : has no history of hormone replacement treatment [FreeTextEntry5] : denies  [FreeTextEntry6] : denies [FreeTextEntry7] : yes in past x 1 month, stopped in her 30s [FreeTextEntry8] : yes x 1 month

## 2021-06-18 NOTE — PHYSICAL EXAM
[Normocephalic] : normocephalic [Atraumatic] : atraumatic [EOMI] : extra ocular movement intact [No Supraclavicular Adenopathy] : no supraclavicular adenopathy [No Cervical Adenopathy] : no cervical adenopathy [Examined in the supine and seated position] : examined in the supine and seated position [Symmetrical] : symmetrical [No dominant masses] : no dominant masses left breast [No Nipple Retraction] : no left nipple retraction [No Nipple Discharge] : no left nipple discharge [No Axillary Lymphadenopathy] : no left axillary lymphadenopathy [Soft] : abdomen soft [Not Tender] : non-tender [No Edema] : no edema [No Rashes] : no rashes [No Ulceration] : no ulceration [de-identified] : in her superior breast @12N3-4, there was no suspicious abnormalities palpated within this area, she does have some scar retraction and post radiation hyperpigmentation  [de-identified] : no suspicious abnormalities noted on exam

## 2021-06-18 NOTE — REVIEW OF SYSTEMS
[As Noted in HPI] : as noted in HPI [Negative] : Heme/Lymph [Fever] : no fever [Chills] : no chills [Chest Pain] : no chest pain [Palpitations] : no palpitations [Abn Vaginal Bleeding] : no unexplained vaginal bleeding [Skin Lesions] : no skin lesions [Skin Wound] : no skin wound [Breast Pain] : no breast pain [Breast Lump] : no breast lump [Hot Flashes] : no hot flashes

## 2021-06-18 NOTE — HISTORY OF PRESENT ILLNESS
[FreeTextEntry1] : Dariana is a 80 F who presents with a right breast cancer, s/p R WLE and SLN Bx on 2020, s/p R breast re-excision on 2020, for a [id number], ER/MN pos, Her 2 NEG, anatomic stage IIA breast cancer. \par \par 2020 -- R WLE and SLN Bx \par -retroareolar mass: IDC, SBR 2, T=1.1cm, +DCIS; positive SUPERIOR margin \par - SLN pos for metastatic disease \par -neg additional anterior, medial, posterior, lateral margins \par \par 2020 -- R WLE of superior margin \par -benign breast tissue \par \par 10/21/2020-2020 -- s/p R WBI (5240 gy)\par \par -on letrozole since 10/1/2020\par \par Her work up was as follows: \par 2020 -- b/l dx tomosynthesis and R breast US \par -scattered areas of fibroglandular densities\par -R: superior breast, anterior depth, 1.5 cm high density superficial mass\par -no suspicious microcalcifications b/l \par -no discrete mass in L breast \par R US \par -@12N3-4, highly vascular irregular hypoechoic mass, measuring 1.4 x 1 x 1.5 cm; correlates with mammo findings \par -no lymphadenopathy \par BIRADS 5 \par \par 2020 -- R US CNBx @12N3-4\par -IDC, moderately to poorly differentiated\par -suspicious for LVI \par -ER/MN positive, Her 2 neg\par -Ki 67 15%\par \par She first palpated a right breast mass 1 month prior.  She thinks that it has increased in size since she first felt it.  She is a little sore since her biopsy, but she did develop a hematoma afterwards.  She denies any left sided breast complaints and denies any nipple discharge or retraction. \par \par Of note, she underwent a knee replacement surgery several years prior.  At that time, she had un-diagnosed CHF and was subsequently found to have a diseased aortic valve as well as coronary artery disease.  She subsequently underwent a CABG and aortic valve tissue based replacement.  Since her surgery, she has not had any chest pains/angina, or shortness of breath.  She has had several episodes of TIA, which has been attributed to her heart disease.  She has not been found to have any carotid artery disease, however.  She is following up with her cardiologist next week to decide if she will need to continue both the plavix and aspirin.   \par \par HISTORICAL RISK FACTORS: \par -1 prior breast biopsy as above, no prior breast surgeries \par -no family history of breast or ovarian cancer \par -, age at first live birth was 37 \par -no prior OCP use \par -no gyn surgeries\par \par INTERVAL HISTORY:\monique Westbrook returns for her FIRST POST OP VISIT, s/p R WLE and SLN bx on 2020 and a R breast re-excision on 2020.\par \par She is healing well from her recent surgery.  She denies any pain, fevers, chills, drainage, or redness. \par \par Her final pathology revealed a 1.1 cm IDC,with DCIS, eventual negative margins, and 1/1 SLN positive for metastatic disease. An oncotype was13, so no chemotherapy was recommended.  \par \par 2021martell Westbrook returns for a 6 month follow up visit.  \par \par Since her last visit, she has since undergone R WBI and has been taking letrozole since 10/2020. \par \par She has no breast related complaints at this time.  She denies any breast pain, has not palpated any new palpable masses in either breast and denies any nipple discharge or retraction.  \par \par She has no bony pains or hot flashes. \par \par Her most recent imaging was performed on 2021, a b/l dx mammogram, which revealed post surgical distortion at her lumpectomy site in her superior R breast, deemed BIRADS 2.

## 2021-06-18 NOTE — DATA REVIEWED
[FreeTextEntry1] : EXAM:  MG MAMMO DIAG W RICARDO BI#\par \par \par PROCEDURE DATE:  06/11/2021\par \par \par \par INTERPRETATION:  Clinical History / Reason for exam: History of right breast malignancy status post lumpectomy in July 2020.\par \par The patient reports her last clinical breast examination was performed about six months ago.\par \par Family history: There is no family history of breast cancer.\par \par Comparisons: Mammograms dating back to 6/9/2020.\par \par Breast ultrasound dating back to 2020.\par \par Views obtained: Bilateral full field digital 2D and digital tomosynthesis images as well as spot views.\par \par Computer-aided detection was utilized in the interpretation of this examination.\par \par Breast composition: There are scattered areas of fibroglandular density.\par \par Findings:\par \par Mammogram:\par There is postsurgical architectural distortion at the lumpectomy site in superior right breast.\par \par No suspicious mass, microcalcifications or areas of architectural distortion seen in the right or left breast.\par \par Impression: No mammographic evidence of malignancy. Postsurgical architectural distortion right breast.\par \par Recommendation: As per post lumpectomy routine, a follow-up right diagnostic mammogram is recommended.\par \par BI-RADS Category 2: Benign\par \par The above findings and recommendations were discussed with the patient at the time of the examination.\par \par \par \par \par \par \par JUDY GROVER MD; Attending Radiologist\par This document has been electronically signed. Jun 11 2021  2:59PM\par

## 2021-06-18 NOTE — ASSESSMENT
[FreeTextEntry1] : Dariana is a 80 F who presents with a right breast cancer, s/p R WLE and SLN Bx on 7/28/2020, s/p R breast re-excision on 8/21/2020, for a [id number], ER/MD pos, Her 2 NEG, anatomic stage IIA breast cancer. \par \par 7/28/2020 -- R WLE and SLN Bx \par -retroareolar mass: IDC, SBR 2, T=1.1cm, +DCIS; positive SUPERIOR margin \par -1/1 SLN pos for metastatic disease \par -neg additional anterior, medial, posterior, lateral margins \par \par 8/24/2020 -- R WLE of superior margin \par -benign breast tissue \par \par 10/21/2020-11/17/2020 -- s/p R WBI (5240 gy)\par \par -on letrozole since 10/1/2020\par \par On exam, no suspicious abnormalities were palpated within either breast.  She has post radiation changes and some scar retraction in her right superior breast at her surgical site.  \par \par Her most recent imaging was performed on 6/11/2021, a b/l dx mammogram, which revealed post surgical distortion at her lumpectomy site in her superior R breast, deemed BIRADS 2. \par \par She will be due for a R dx mammogram on 12/11/2021.  This will be scheduled for her today.  I will have her follow up after for a CBE. \par \par AS REVIEW: \par Her final pathology revealed a 1.1 cm IDC, ER/MD pos, Her 2 neg, SBR 2, with 1/1 SLN positive for metastatic disease without extracapsular extension.  She initially had a positive superior margin, however on re-excision, no further areas of disease were noted.  \par  \par In regards to her RIGHT sided breast cancer, she is a great candidate for either breast conservation surgery or a mastectomy.  Her lesion is located @12N3-4   based off the US so she is a candidate for a nipple sparing mastectomy.  However, there is no difference in survival between a lumpectomy + radiation therapy and a mastectomy.  However the rate of local recurrence is slightly higher with the lumpectomy. The rate of recurrence with a mastectomy is not zero, however, and is likely closer to 4-9%.  \par \par In regards to systemic therapy, we briefly discussed both chemotherapy and endocrine therapy. If the tumor is larger than 1 cm and her 2 negative, we would likely need to send an additional study on her tumor sample, called an oncotype DX to make the determination regarding if chemotherapy would help her.  At the completion of all these treatments, she should get endocrine therapy, at current time she would need an AI, for a total of at least 5 years.  \par \par Per ASBrS consensus guidelines, any patient with a diagnosis of breast cancer should be considered for panel genetic testing, as 10% of patients were found to have a mutation on panel testing.  This was offered to the patient today as she does have two daughters and she has agreed to panel genetic testing.  [facility] panel genetic testing was performed which revealed a VUS in SDHB.  THese results were discussed with Dariana and no additional testing or screening are indicated for VUS.  \par \par All of her questions were answered.  She knows to call with any further questions or concerns.  \par I have spoken with her daughter, Autumn Torres ([phone]) and discussed the results with her as well. \par \par PLAN: \par -sozo \par -INVITAE panel genetic testing --VUS in SDHB\par -R dx mammogram 12/11/2021 \par -f/up after

## 2021-07-26 ENCOUNTER — APPOINTMENT (OUTPATIENT)
Dept: HEMATOLOGY ONCOLOGY | Facility: CLINIC | Age: 81
End: 2021-07-26

## 2021-08-12 ENCOUNTER — APPOINTMENT (OUTPATIENT)
Dept: RADIATION ONCOLOGY | Facility: HOSPITAL | Age: 81
End: 2021-08-12
Payer: MEDICARE

## 2021-08-12 VITALS
SYSTOLIC BLOOD PRESSURE: 138 MMHG | WEIGHT: 120.25 LBS | BODY MASS INDEX: 22.72 KG/M2 | DIASTOLIC BLOOD PRESSURE: 63 MMHG | TEMPERATURE: 96.8 F | HEART RATE: 67 BPM | RESPIRATION RATE: 12 BRPM | OXYGEN SATURATION: 100 %

## 2021-08-12 DIAGNOSIS — Z92.3 PERSONAL HISTORY OF IRRADIATION: ICD-10-CM

## 2021-08-12 PROCEDURE — 99213 OFFICE O/P EST LOW 20 MIN: CPT

## 2021-08-12 NOTE — HISTORY OF PRESENT ILLNESS
[FreeTextEntry1] : \par WISAM GIPSON returns to clinic in follow up visit.  As you know, WISAM GIPSON is an 80 year old female with invasive ductal carcinoma of the right breast, G2, ER/AR + / HER2 neg, [id number], Stage IA. She is s/p breast conserving surgery.  The patient received 5240 cGy to the right breast from 10/21/2020 through 11/17/2020.  I last saw her in Feb, 2021.    In the interim, the patient reports doing well.   Her last mammogram on 6/11/2021, shows no evidence of malignancy.  She is tolerating Letrozole well.  She continues to be independent and very active, fatigued at times, resolves with rest.  She denies breast pain, CP and SOB.  \par \par \par Upcoming appointments: \par Dr. Camp 12/22/2021- mammogram right breast ordered for 12/11/2021\par Dr. Mitchell 9/10/2021\par \par \par

## 2021-08-12 NOTE — REVIEW OF SYSTEMS
[Fatigue] : fatigue [Easy Bruising] : a tendency for easy bruising [Negative] : Psychiatric [Fever] : no fever [Chills] : no chills [Recent Change In Weight] : ~T no recent weight change [Dysphagia] : no dysphagia [Chest Pain] : no chest pain [Palpitations] : no palpitations [Lower Ext Edema] : no lower extremity edema [Shortness Of Breath] : no shortness of breath [Wheezing] : no wheezing [Cough] : no cough [FreeTextEntry2] : with moderate exertion

## 2021-08-12 NOTE — LETTER CLOSING
[Consult Closing:] : Thank you for allowing me to participate in the care of this patient.  If you have any questions, please do not hesitate to contact me. [Sincerely yours,] : Sincerely yours, [FreeTextEntry3] : April Mcclelland M.D. \par \par Electronically proofread and signed by:  April Mcclelland MD\par Attending, Department of Radiation Medicine\par [facility]\par \par CC: Dr. Mitchell

## 2021-08-12 NOTE — DISEASE MANAGEMENT
[Pathological] : TNM Stage: p [IA] : IA [FreeTextEntry4] : invasive ductal carcinoma of the right breast, G2, ER/DC + / HER2 neg [TTNM] : 1c [NTNM] : 1a [MTNM] : 0

## 2021-08-12 NOTE — PHYSICAL EXAM
[Thin] : thin [Sclera] : the sclera and conjunctiva were normal [Hearing Threshold Finger Rub Not [place]] : hearing was normal [Heart Rate And Rhythm] : heart rate and rhythm were normal [Heart Sounds] : normal S1 and S2 [Murmurs] : no murmurs present [Edema] : no peripheral edema present [Abdomen Soft] : soft [Abdomen Tenderness] : non-tender [Cervical Lymph Nodes Enlarged Posterior Bilaterally] : posterior cervical [Cervical Lymph Nodes Enlarged Anterior Bilaterally] : anterior cervical [Supraclavicular Lymph Nodes Enlarged Bilaterally] : supraclavicular [Axillary Lymph Nodes Enlarged Bilaterally] : axillary [No Focal Deficits] : no focal deficits [Normal] : oriented to person, place and time, the affect was normal, the mood was normal and not anxious [de-identified] : She is examined in both the upright and supine positions.  The right breast has mild residual hyperpigmentation.   The left breast is unremarkable.  No palpable mass in either breast. [de-identified] : r

## 2021-09-10 ENCOUNTER — OUTPATIENT (OUTPATIENT)
Dept: OUTPATIENT SERVICES | Facility: HOSPITAL | Age: 81
LOS: 1 days | Discharge: HOME | End: 2021-09-10

## 2021-09-10 ENCOUNTER — APPOINTMENT (OUTPATIENT)
Dept: HEMATOLOGY ONCOLOGY | Facility: CLINIC | Age: 81
End: 2021-09-10
Payer: MEDICARE

## 2021-09-10 VITALS
WEIGHT: 121 LBS | HEART RATE: 67 BPM | SYSTOLIC BLOOD PRESSURE: 109 MMHG | DIASTOLIC BLOOD PRESSURE: 63 MMHG | HEIGHT: 61 IN | BODY MASS INDEX: 22.84 KG/M2 | TEMPERATURE: 97.2 F

## 2021-09-10 DIAGNOSIS — Z96.652 PRESENCE OF LEFT ARTIFICIAL KNEE JOINT: Chronic | ICD-10-CM

## 2021-09-10 DIAGNOSIS — Z98.890 OTHER SPECIFIED POSTPROCEDURAL STATES: Chronic | ICD-10-CM

## 2021-09-10 DIAGNOSIS — T82.898A OTHER SPECIFIED COMPLICATION OF VASCULAR PROSTHETIC DEVICES, IMPLANTS AND GRAFTS, INITIAL ENCOUNTER: Chronic | ICD-10-CM

## 2021-09-10 PROCEDURE — 99213 OFFICE O/P EST LOW 20 MIN: CPT

## 2021-09-10 RX ORDER — LETROZOLE TABLETS 2.5 MG/1
2.5 TABLET, FILM COATED ORAL DAILY
Qty: 90 | Refills: 0 | Status: ACTIVE | COMMUNITY
Start: 2020-10-01 | End: 1900-01-01

## 2021-09-18 NOTE — CONSULT LETTER
[Dear  ___] : Dear  [unfilled], [Consult Letter:] : I had the pleasure of evaluating your patient, [unfilled]. [Please see my note below.] : Please see my note below. [Consult Closing:] : Thank you very much for allowing me to participate in the care of this patient.  If you have any questions, please do not hesitate to contact me. [Sincerely,] : Sincerely, [DrHumaira  ___] : Dr. AMOR [DrHumaira ___] : Dr. AMOR [FreeTextEntry3] : Linda Mitchell MD

## 2021-09-18 NOTE — PHYSICAL EXAM
[Fully active, able to carry on all pre-disease performance without restriction] : Status 0 - Fully active, able to carry on all pre-disease performance without restriction [Normal] : well developed, well nourished, in no acute distress [de-identified] : physical exam deferred (telehealth),

## 2021-09-18 NOTE — ASSESSMENT
Date of Service: 03/23/2017    PREOPERATIVE DIAGNOSIS:  Crohn's enteritis with perforation and obstruction.     POSTOPERATIVE DIAGNOSIS:  Crohn's enteritis with perforation and obstruction.    PROCEDURE PERFORMED:  1.  Exploratory laparotomy.  2.  Lysis of adhesions x90 minutes.  3.  Bowel resection with ileocolonic anastomosis.  4.  Abdominal cavity irrigtaion    SURGEON:  Milli Fox MD.     ASSISTANTS:  Dr. Jarrod Petersen.  Dr. Petersen was required for assistance for the operation including but not limited to maintenance of exposure, hemostasis as well as operative planning.  This required skill above and beyond the surgical tech who was providing instrumentation.    Mere Rogers.    ANESTHESIA:  General.    INDICATIONS:  The patient is a 52-year-old gentleman who presented last week with a small-bowel obstruction due to Crohn's disease, presumed due to a stricture at his neoterminal ileum.  Medical management was instituted; however, he failed to respond.  CT was repeated today which showed progressive inflammation with free air and abscess along the right colon.  Exploration was recommended and he has consented.    FINDINGS:  Extensive adhesive mass of small bowel and right colon in the right abdomen with some dilated small bowel.  He had perforated either along the small bowel or colon in multiple places with a contained perforation along the right abdominal wall and posteriorly.  Resection was performed with primary anastomosis, 250 cm of small bowel at least remained at the end of the surgery.    DESCRIPTION OF PROCEDURE:  Consent was obtained as discussed above.  The patient was brought to the operating room and laid on the table in supine position.  Anesthesia was administered.  Prophylactic antibiotics were not given as he was on scheduled Cipro and Flagyl ready.  He was also on scheduled venous thromboembolism prophylaxis.  SCDs were in place.  Tobias catheter was placed in the OR.  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 \par \par Recommendations:\par -- Continue adjuvant endocrine therapy with Letrozole.\par -- Advised calcium and vitamin D supplementation\par -- Again advised her to have a bone density scan, however patient refusing. Discussed that letrozole increases risk of osteopenia and osteoporosis, patients understands and elects to forego it. Will give additional recommendation if she does the scan based on the results. \par -- She had bilateral diagnostic mammo June 2021 which was BI-RADS 2 and right breast US in March 2021 which was also BI-RADS 2. Repeat imaging in 6 months.\par -- Followup with Dr. Camp and Dr. Mcclelland as directed.\par -- RTO for followup in 6 months.\par \par  draped in sterile fashion.  Timeout was performed for procedure verification and patient identification.  A midline incision was made.  We dissected sharply down to fascia.  Abdomen was entered without incident.  Fascial excision was extended cranially and caudally for a short distance.  On entering the abdomen there was no immediate succus or purulent fluid appreciated.  There were extensive adhesions of small bowel to the abdominal wall.  They were carefully taken down with Metzenbaum scissors.  We were able to free up the abdominal wall anterior both on the right and the left in sufficient degree to place the Bookwalter retractor.  With the Bookwalter in place, we continued dissection.  There were intraloop adhesions through most of the small intestine.  There appeared to be a significant amount of proximally decompressed small bowel.  I was able to, after some adhesiolysis, identify the ligament of Treitz and began to follow this distally.  We carefully performed extensive lysis of adhesions.  Very distally there were 2 areas where expected enterotomies were created.  The patient's bowel was incredibly distended and friable in this area distally.  These were oversewn as we went along with interrupted 3-0 silk sutures.  The majority of the adhesions and inflammation appeared to be along the right abdomen.  Slowly began taking down the adhesions from the pelvic sidewall.  In doing so, we entered a large abscess cavity on the right side.  This extended from up near the hepatic flexure all the way down along to his pelvic brim.  With both blunt and sharp dissection this adhesive mass was slowly rotated more medially.  Suction was carefully done in order to try to limit the amount of contamination.  It appeared that there had been perforation in multiple areas of either small bowel or colon all along the right side of the abdomen.  We were able to find a healthy portion of transverse colon.  We followed this as  proximally as we could before it entered this inflammatory mass.  Using a BONI stapler the transverse colon was transected.  Again, we followed the small bowel as distally as we could before it entered this mass.  We did extensive adhesiolysis to salvage as much small bowel as possible.  Once we were as distal as I believe we could safely be a small mesenteric window was created and the small bowel was again transected with a BONI stapler.  Continued with careful dissection.  It appeared that the bowel had perforated both laterally but also posteriorly onto the kidney.  With careful blunt dissection this was dissected free.  We were able to visualize the ureter through its entire course and this appeared free from our dissection and free from injury.  Urine remained clear throughout the case.  We mobilized the inflammatory bundle of bowel medially.  We were able to identify the duodenal sweep which appeared uninvolved and uninjured.  Once we had this mass of bowel on a mesenteric stalk with the LigaSure device we began carefully taking down the mesentery as close to the bowel as possible.  Once the mass was freed, it was sent away for specimen.  There was some bleeding along the mesentery which was oversewn with 3-0 Vicryl sutures.  At this point, Dr. Petersen had arrived to assist.  Required his assistance for exposure and also to discuss operative planning.  Remainder of the abdomen appeared somewhat healthy.  Despite steroid treatments over the past week and his extensive disease that all seemed to be somewhat localized to the right side.  I copiously irrigated the abdomen with warm Saline and this was suctioned out.  There was extensive inflammation and inflammatory rind along the right side of the abdomen.  The kidney and ureter were again evaluated and did not appear to be injured.  Ran the bowel again from ligament of Treitz distally.  It appeared there was at least 250 cm of small bowel intact.  There was 1  section for the last distance of small bowel that appeared to have been ischemic due to mesenteric dissection and, as such, this was re-transected with the BONI stapler and sent for pathology.  In a similar fashion along the colon I was pretty confident that I had taken the middle colic.  The division of the colon had been somewhat to the right of midline and I brought this back somewhat to ensure good blood supply.  With a BONI stapler an additional couple of centimeters of colon was removed and sent with the specimen.  We now had what appeared to be rather healthy small bowel and colon.  We had discussion about whether or not to create a stoma, he had been on steroids for the past week; however, was hemodynamically normal and contamination was somewhat limited.  As such we created a primary side-to-side functional end-to-end anastomosis.  The colon and the antimesenteric tinea and the antimesenteric small bowel were lined up using a 3-0 silk suture.  Enterotomy and colotomy was made and a BONI stapler was placed down the lumen to create the anastomosis.  The stapler was removed and the common enterotomy was oversewn using a running 2-0 Vicryl and then oversewn using a running 2-0 prolene suture.  A crotch stitch was placed.  The end of the colon staple line had been oversewn previously with a 2-0 prolene.  The mesentery was closed using a running 3-0 silk suture.  I then again thoroughly evaluated the bowel.  There was only 1 section remaining that had previously been oversewn due to anticipated serosal injury during adhesiolysis.  This was a couple centimeters proximal to the anastomosis.  I performed more irrigation.  I elected to leave 2 drains, one on the left and the right pericolic gutter where most of his perforation had been.  Another was left on the left near our anastomosis.  I then closed the midline using running 0 looped PDS suture.  We extensively irrigated out the incision before closing with staples.   Drains were placed to bulb suction.  Dressings were applied.  He tolerated the procedure well and returned to the floor.      Dictated By: Milli Fox MD  Signing Provider: MD BETH Joy/stephany ([id number])  DD: 03/24/2017 09:32:29 TD: 03/24/2017 10:05:31    Copy Sent To:

## 2021-09-18 NOTE — HISTORY OF PRESENT ILLNESS
[de-identified] : 1/7/2021:\par The patient is evaluated via telehealth with phone only. She has stage IIA ([id number]) IDC of the right breast, G2, ER/NY positive, her-2 negative, s/p right lumpectomy and SLNB, with negative margins. Oncotype RS is 13, in the low risk range. She saw Dr. Mcclelland and completed adjuvant whole breast radiation on 11/17/2020. She started on adjuvant endocrine therapy with Letrozole and tolerated well so far. She has some mild aches and pains which are bearable. She was given a referral for bone density but it was not done yet. \par \par 9/10/21: \par WISAM presents for followup of her stage IIA ([id number]) IDC of the right breast, G2, ER/NY positive, her-2 negative, s/p right lumpectomy and SLNB, with negative margins. Oncotype RS is 13, in the low risk range. She saw Dr. Mcclelland and completed adjuvant whole breast radiation on 11/17/2020. She started on adjuvant endocrine therapy with Letrozole. SHe is tolerating it well, has some hot flashes described as feeling hot at night requiring the AC and she also c/o chronic lower back pain. \par She has refused to get DEXA. She states she will not take any additional medication even if she has osteoporosis so there is no reason to get it. We discussed that letrozole puts her at increased risk, she understands and declines imaging and medication. Similarly, she will not take Vitamin D and calcium as well.  \par She had bilateral diagnostic mammo June 2021 which was BI-RADS 2 and she had right breast US in March 2021 which was also BI-RADS 2.   [de-identified] : 79 year old female is referred by Dr. Camp for consultation of adjuvant systemic therapy for newly diagnosed right breast cancer. Dariana had dx mammo and US on 6/9/2020 after she felt a palpable lump in her right breast which showed an irregular hypoechoic mass measuring 14 x 10 x 15 mm in the right breast 12:00 axis, 3 to 4 cm from the nipple. There was no right axillary lymphadenopathy. \par \par On 6/17/2020, she had US guided biopsy of the right breast mass at 12 o’clock. The pathology showed invasive ductal carcinoma, moderately to poorly differentiated with focal signet ring cell features and a large geographic area of coagulative tumoral necrosis, suspicious for lymphovascular invasion. It was ER pos 100%, OH pos 80%, HER 2 negative, Ki-67 index 15%. \par \par On 6/30/2020, she had an MRI of bilateral breasts, which showed biopsy-proven malignancy with biopsy clip and surrounding hematoma at the superior aspect of the right breast. No additional areas of abnormal enhancement. No MRI evidence of malignancy in the left breast. \par  \par On 7/28/2020, she underwent a right lumpectomy and sentinel node biopsy. The pathology revealed 1.1 cm invasive carcinoma, negative EIC. Superior surgical margin was positive. There was 1/1 positive sentinel lymph node measuring 2.5 mm. No ZACH. LVSI could not be determined. The AJCC 8th edition pathologic stage is IIA ([id number]).\par \par On 8/21/2020, patient underwent re-excision of the right breast superior margin and pathology showed no residual disease. \par \par The surgical specimen was sent for Oncotype dx assay. Her RS is 13, predicting 13% risk of distant recurrence at 9 years with endocrine therapy alone.\par \par She has recovered well from surgery. She saw Dr. Mcclelland and was recommended to have adjuvant WBI. \par

## 2021-09-20 DIAGNOSIS — C50.111 MALIGNANT NEOPLASM OF CENTRAL PORTION OF RIGHT FEMALE BREAST: ICD-10-CM

## 2021-09-20 DIAGNOSIS — Z51.81 ENCOUNTER FOR THERAPEUTIC DRUG LEVEL MONITORING: ICD-10-CM

## 2021-12-13 ENCOUNTER — OUTPATIENT (OUTPATIENT)
Dept: OUTPATIENT SERVICES | Facility: HOSPITAL | Age: 81
LOS: 1 days | Discharge: HOME | End: 2021-12-13
Payer: MEDICARE

## 2021-12-13 ENCOUNTER — RESULT REVIEW (OUTPATIENT)
Age: 81
End: 2021-12-13

## 2021-12-13 DIAGNOSIS — R92.8 OTHER ABNORMAL AND INCONCLUSIVE FINDINGS ON DIAGNOSTIC IMAGING OF BREAST: ICD-10-CM

## 2021-12-13 DIAGNOSIS — Z98.890 OTHER SPECIFIED POSTPROCEDURAL STATES: Chronic | ICD-10-CM

## 2021-12-13 DIAGNOSIS — T82.898A OTHER SPECIFIED COMPLICATION OF VASCULAR PROSTHETIC DEVICES, IMPLANTS AND GRAFTS, INITIAL ENCOUNTER: Chronic | ICD-10-CM

## 2021-12-13 DIAGNOSIS — Z96.652 PRESENCE OF LEFT ARTIFICIAL KNEE JOINT: Chronic | ICD-10-CM

## 2021-12-13 PROCEDURE — G0279: CPT | Mod: 26

## 2021-12-13 PROCEDURE — 77065 DX MAMMO INCL CAD UNI: CPT | Mod: 26,RT

## 2022-01-06 ENCOUNTER — APPOINTMENT (OUTPATIENT)
Dept: BREAST CENTER | Facility: CLINIC | Age: 82
End: 2022-01-06
Payer: MEDICARE

## 2022-01-06 VITALS
DIASTOLIC BLOOD PRESSURE: 70 MMHG | SYSTOLIC BLOOD PRESSURE: 122 MMHG | HEIGHT: 61 IN | TEMPERATURE: 97.5 F | WEIGHT: 121 LBS | BODY MASS INDEX: 22.84 KG/M2

## 2022-01-06 DIAGNOSIS — Z91.89 OTHER SPECIFIED PERSONAL RISK FACTORS, NOT ELSEWHERE CLASSIFIED: ICD-10-CM

## 2022-01-06 PROCEDURE — 99212 OFFICE O/P EST SF 10 MIN: CPT

## 2022-01-06 PROCEDURE — 93702 BIS XTRACELL FLUID ANALYSIS: CPT

## 2022-01-06 NOTE — ASSESSMENT
[FreeTextEntry1] : Dariana is a 81 F who presents with a right breast cancer, s/p R WLE and SLN Bx on 7/28/2020, s/p R breast re-excision on 8/21/2020, for a [id number], ER/MT pos, Her 2 NEG, anatomic stage IIA breast cancer. \par \par 7/28/2020 -- R WLE and SLN Bx \par -retroareolar mass: IDC, SBR 2, T=1.1cm, +DCIS; positive SUPERIOR margin \par -1/1 SLN pos for metastatic disease \par -neg additional anterior, medial, posterior, lateral margins \par \par 8/24/2020 -- R WLE of superior margin \par -benign breast tissue \par \par 10/21/2020-11/17/2020 -- s/p R WBI (5240 gy)\par \par -on letrozole since 10/1/2020\par \par On exam, no suspicious abnormalities were palpated within either breast.  She has post radiation changes and some scar retraction in her right superior breast at her surgical site.  \par \par Her imaging is as follows:\par 12/13/2021 RIGHT dx mammo\par -breasts are heterogeneously dense\par -Postsurgical architectural distortion at the lumpectomy site in the right breast.\par BIRADS2\par \par She will be due for a b/l dx mammogram and US on 6/14/2022.  This will be scheduled for her today.  I will have her follow up after for a CBE. \par \par AS REVIEW: \par Her final pathology revealed a 1.1 cm IDC, ER/MT pos, Her 2 neg, SBR 2, with 1/1 SLN positive for metastatic disease without extracapsular extension.  She initially had a positive superior margin, however on re-excision, no further areas of disease were noted.  \par  \par In regards to her RIGHT sided breast cancer, she is a great candidate for either breast conservation surgery or a mastectomy.  Her lesion is located @12N3-4   based off the US so she is a candidate for a nipple sparing mastectomy.  However, there is no difference in survival between a lumpectomy + radiation therapy and a mastectomy.  However the rate of local recurrence is slightly higher with the lumpectomy. The rate of recurrence with a mastectomy is not zero, however, and is likely closer to 4-9%.  \par \par In regards to systemic therapy, we briefly discussed both chemotherapy and endocrine therapy. If the tumor is larger than 1 cm and her 2 negative, we would likely need to send an additional study on her tumor sample, called an oncotype DX to make the determination regarding if chemotherapy would help her.  At the completion of all these treatments, she should get endocrine therapy, at current time she would need an AI, for a total of at least 5 years.  \par \par Per ASBrS consensus guidelines, any patient with a diagnosis of breast cancer should be considered for panel genetic testing, as 10% of patients were found to have a mutation on panel testing.  This was offered to the patient today as she does have two daughters and she has agreed to panel genetic testing.  [facility] panel genetic testing was performed which revealed a VUS in SDHB.  THese results were discussed with Dariana and no additional testing or screening are indicated for VUS.  \par \par All of her questions were answered.  She knows to call with any further questions or concerns.  \par I have spoken with her daughter, Autumn Torres ([phone]) and discussed the results with her as well. \par \par PLAN: \par -MARY L-Dex Score:  right arm -10.4\par   Date: 1/6/22\par -[facility] panel genetic testing --VUS in SDHB\par -b/l dx mammogram and US on 6/14/2022\par -f/up after

## 2022-01-06 NOTE — PHYSICAL EXAM
[Normocephalic] : normocephalic [Atraumatic] : atraumatic [EOMI] : extra ocular movement intact [Examined in the supine and seated position] : examined in the supine and seated position [Symmetrical] : symmetrical [No dominant masses] : no dominant masses in right breast  [No dominant masses] : no dominant masses left breast [No Nipple Retraction] : no left nipple retraction [No Nipple Discharge] : no left nipple discharge [No Axillary Lymphadenopathy] : no left axillary lymphadenopathy [No Edema] : no edema [No Rashes] : no rashes [No Ulceration] : no ulceration [de-identified] : in her superior breast @12N3-4, there was no suspicious abnormalities palpated within this area, she does have some scar retraction and post radiation hyperpigmentation  [de-identified] : SOZO L-Dex Score:  right arm -10.4 Date: 1/6/22

## 2022-01-06 NOTE — DATA REVIEWED
[FreeTextEntry1] : EXAM:  MG MAMMO DIAG W RICARDO RT#\par \par \par PROCEDURE DATE:  12/13/2021\par \par \par \par INTERPRETATION:  Clinical History / Reason for exam: Right breast malignancy status post lumpectomy in 2020.\par \par The patient reports last clinical breast examination was performed about: six months ago.\par \par Family history of breast cancer: There is no family history of breast cancer.\par \par Comparisons: Mammograms dating back 2020.\par \par Views obtained: right full field digital 2D and digital tomosynthesis images as well as magnification views.\par \par Computer-aided detection was utilized in the interpretation of this examination.\par \par Breast composition: The breasts are heterogeneously dense, which may obscure small masses.\par \par Findings:\par \par Mammogram:\par \par No suspicious mass, microcalcifications or areas of architectural distortion seen in the right breast. Postsurgical architectural distortion at the lumpectomy site in the right breast.\par \par Impression: No mammographic evidence of malignancy.\par \par Recommendation: As per post lumpectomy routine, a follow-up bilateral mammogram is recommended in 6 months.\par \par BI-RADS Category 2: Benign\par

## 2022-01-06 NOTE — HISTORY OF PRESENT ILLNESS
[FreeTextEntry1] : Dariana is a 80 F who presents with a right breast cancer, s/p R WLE and SLN Bx on 2020, s/p R breast re-excision on 2020, for a [id number], ER/AK pos, Her 2 NEG, anatomic stage IIA breast cancer. \par \par 2020 -- R WLE and SLN Bx \par -retroareolar mass: IDC, SBR 2, T=1.1cm, +DCIS; positive SUPERIOR margin \par - SLN pos for metastatic disease \par -neg additional anterior, medial, posterior, lateral margins \par \par 2020 -- R WLE of superior margin \par -benign breast tissue \par \par 10/21/2020-2020 -- s/p R WBI (5240 gy)\par \par -on letrozole since 10/1/2020\par \par Her work up was as follows: \par 2020 -- b/l dx tomosynthesis and R breast US \par -scattered areas of fibroglandular densities\par -R: superior breast, anterior depth, 1.5 cm high density superficial mass\par -no suspicious microcalcifications b/l \par -no discrete mass in L breast \par R US \par -@12N3-4, highly vascular irregular hypoechoic mass, measuring 1.4 x 1 x 1.5 cm; correlates with mammo findings \par -no lymphadenopathy \par BIRADS 5 \par \par 2020 -- R US CNBx @12N3-4\par -IDC, moderately to poorly differentiated\par -suspicious for LVI \par -ER/AK positive, Her 2 neg\par -Ki 67 15%\par \par She first palpated a right breast mass 1 month prior.  She thinks that it has increased in size since she first felt it.  She is a little sore since her biopsy, but she did develop a hematoma afterwards.  She denies any left sided breast complaints and denies any nipple discharge or retraction. \par \par Of note, she underwent a knee replacement surgery several years prior.  At that time, she had un-diagnosed CHF and was subsequently found to have a diseased aortic valve as well as coronary artery disease.  She subsequently underwent a CABG and aortic valve tissue based replacement.  Since her surgery, she has not had any chest pains/angina, or shortness of breath.  She has had several episodes of TIA, which has been attributed to her heart disease.  She has not been found to have any carotid artery disease, however.  She is following up with her cardiologist next week to decide if she will need to continue both the plavix and aspirin.   \par \par HISTORICAL RISK FACTORS: \par -1 prior breast biopsy as above, no prior breast surgeries \par -no family history of breast or ovarian cancer \par -, age at first live birth was 37 \par -no prior OCP use \par -no gyn surgeries\par \par INTERVAL HISTORY:\monique Westbrook returns for her FIRST POST OP VISIT, s/p R WLE and SLN bx on 2020 and a R breast re-excision on 2020.\par \par She is healing well from her recent surgery.  She denies any pain, fevers, chills, drainage, or redness. \par \par Her final pathology revealed a 1.1 cm IDC,with DCIS, eventual negative margins, and  SLN positive for metastatic disease. An oncotype was13, so no chemotherapy was recommended.  \par \par 2021\monique Westbrook returns for a 6 month follow up visit.  \par \par Since her last visit, she has since undergone R WBI and has been taking letrozole since 10/2020. \par \par She has no breast related complaints at this time.  She denies any breast pain, has not palpated any new palpable masses in either breast and denies any nipple discharge or retraction.  \par \par She has no bony pains or hot flashes. \par \par Her most recent imaging was performed on 2021, a b/l dx mammogram, which revealed post surgical distortion at her lumpectomy site in her superior R breast, deemed BIRADS 2. \par \par INTERVAL HISTORY 22\monique Westbrook returns for a 6 month follow up visit.  \par She has no breast related complaints at this time.  She denies any breast pain, has not palpated any new palpable masses in either breast and denies any nipple discharge or retraction.\par \par Her imaging is as follows:\par 2021 RIGHT dx mammo\par -breasts are heterogeneously dense\par -Postsurgical architectural distortion at the lumpectomy site in the right breast.\par BIRADS2\par \par SOZO L-Dex Score:  right arm -10.4\par Date: 22\par \par

## 2022-02-09 ENCOUNTER — APPOINTMENT (OUTPATIENT)
Dept: RADIATION ONCOLOGY | Facility: HOSPITAL | Age: 82
End: 2022-02-09
Payer: MEDICARE

## 2022-02-09 ENCOUNTER — OUTPATIENT (OUTPATIENT)
Dept: OUTPATIENT SERVICES | Facility: HOSPITAL | Age: 82
LOS: 1 days | Discharge: HOME | End: 2022-02-09

## 2022-02-09 VITALS
HEART RATE: 72 BPM | WEIGHT: 123 LBS | TEMPERATURE: 96.4 F | DIASTOLIC BLOOD PRESSURE: 66 MMHG | BODY MASS INDEX: 23.24 KG/M2 | RESPIRATION RATE: 14 BRPM | OXYGEN SATURATION: 98 % | SYSTOLIC BLOOD PRESSURE: 139 MMHG

## 2022-02-09 DIAGNOSIS — C50.111 MALIGNANT NEOPLASM OF CENTRAL PORTION OF RIGHT FEMALE BREAST: ICD-10-CM

## 2022-02-09 DIAGNOSIS — Z98.890 OTHER SPECIFIED POSTPROCEDURAL STATES: Chronic | ICD-10-CM

## 2022-02-09 DIAGNOSIS — C50.411 MALIGNANT NEOPLASM OF UPPER-OUTER QUADRANT OF RIGHT FEMALE BREAST: ICD-10-CM

## 2022-02-09 DIAGNOSIS — T82.898A OTHER SPECIFIED COMPLICATION OF VASCULAR PROSTHETIC DEVICES, IMPLANTS AND GRAFTS, INITIAL ENCOUNTER: Chronic | ICD-10-CM

## 2022-02-09 DIAGNOSIS — Z96.652 PRESENCE OF LEFT ARTIFICIAL KNEE JOINT: Chronic | ICD-10-CM

## 2022-02-09 PROCEDURE — 99213 OFFICE O/P EST LOW 20 MIN: CPT

## 2022-02-09 RX ORDER — FAMOTIDINE 10 MG/1
TABLET, FILM COATED ORAL
Refills: 0 | Status: DISCONTINUED | COMMUNITY
End: 2022-02-09

## 2022-02-09 RX ORDER — LISINOPRIL 30 MG/1
TABLET ORAL
Refills: 0 | Status: DISCONTINUED | COMMUNITY
End: 2022-02-09

## 2022-02-09 NOTE — DISEASE MANAGEMENT
[Pathological] : TNM Stage: p [IA] : IA [FreeTextEntry4] : invasive ductal carcinoma of the right breast, G2, ER/LA + / HER2 neg [TTNM] : 1c [NTNM] : 1a [MTNM] : 0

## 2022-02-09 NOTE — PHYSICAL EXAM
[Thin] : thin [Sclera] : the sclera and conjunctiva were normal [Hearing Threshold Finger Rub Not [place]] : hearing was normal [Heart Rate And Rhythm] : heart rate and rhythm were normal [Heart Sounds] : normal S1 and S2 [Cervical Lymph Nodes Enlarged Posterior Bilaterally] : posterior cervical [Cervical Lymph Nodes Enlarged Anterior Bilaterally] : anterior cervical [Supraclavicular Lymph Nodes Enlarged Bilaterally] : supraclavicular [Axillary Lymph Nodes Enlarged Bilaterally] : axillary [Normal] : oriented to person, place and time, the affect was normal, the mood was normal and not anxious [de-identified] : She is examined in both the upright and supine positions.  The right breast has a well healed scar. No residual hyperpigmentation.  The left breast is unremarkable.  No palpable mass in either breast.

## 2022-02-09 NOTE — HISTORY OF PRESENT ILLNESS
[FreeTextEntry1] : \par WISAM GIPSON returns to clinic in follow up visit.  As you know, WISAM GIPSON is an 81 year old female with invasive ductal carcinoma of the right breast, G2, ER/NY + / HER2 neg, [id number], Stage IA. She is s/p breast conserving surgery.  The patient received 5240 cGy to the right breast from 10/21/2020 through 11/17/2020.  I last saw her in Aug, 2021. In the interim, the patient reports doing well. Denies any pain to the breast. Her last mammogram on 12/13/2021, showed no evidence of malignancy.  She is tolerating Letrozole well. Denies any fatigue.  \par \par \par Upcoming appointments: \par Dr. Camp 6/21/2022\par Dr. Mitchell 3/28/2022\par \par \par

## 2022-02-09 NOTE — LETTER CLOSING
[Consult Closing:] : Thank you for allowing me to participate in the care of this patient.  If you have any questions, please do not hesitate to contact me. [Sincerely yours,] : Sincerely yours, [FreeTextEntry3] : April Mcclelland M.D. \par \par Electronically proofread and signed by:  April Mcclelland MD\par Attending, Department of Radiation Medicine\par [facility]\par \par CC: Dr. Mitchell

## 2022-04-05 ENCOUNTER — APPOINTMENT (OUTPATIENT)
Dept: HEMATOLOGY ONCOLOGY | Facility: CLINIC | Age: 82
End: 2022-04-05
Payer: MEDICARE

## 2022-04-05 DIAGNOSIS — Z79.811 ENCOUNTER FOR THERAPEUTIC DRUG LVL MONITORING: ICD-10-CM

## 2022-04-05 DIAGNOSIS — Z51.81 ENCOUNTER FOR THERAPEUTIC DRUG LVL MONITORING: ICD-10-CM

## 2022-04-05 PROCEDURE — 99213 OFFICE O/P EST LOW 20 MIN: CPT | Mod: 95

## 2022-04-05 NOTE — ASSESSMENT
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 \par \par Assessment and Plan:\par -- Continue adjuvant endocrine therapy with Letrozole. Discussed AI related side effects including hot flashes, weight gain and hair thinning. She opted to remain on Letrozole. \par -- Right breast dx mammo and US in 12/2021 reviewed which showed stable postsurgical changes. She will have b/l dx mammo and right breast US in 6/2022. Will mail the script to her. \par -- Discussed that letrozole may increases bone loss. Previously, she declined bone density study. She is agreeable now. Will mail an order to her. Advised to take calcium and vitamin D supplement.\par -- Followup with Dr. Camp and Dr. Mcclelland as directed.\par -- RTO for followup in 6 months.\par \par

## 2022-04-05 NOTE — HISTORY OF PRESENT ILLNESS
[Home] : at home, [unfilled] , at the time of the visit. [Medical Office: ([facility])___] : at the medical office located in  [Verbal consent obtained from patient] : the patient, [unfilled] [de-identified] : 79 year old female is referred by Dr. Camp for consultation of adjuvant systemic therapy for newly diagnosed right breast cancer. Dariana had dx mammo and US on 6/9/2020 after she felt a palpable lump in her right breast which showed an irregular hypoechoic mass measuring 14 x 10 x 15 mm in the right breast 12:00 axis, 3 to 4 cm from the nipple. There was no right axillary lymphadenopathy. \par \par On 6/17/2020, she had US guided biopsy of the right breast mass at 12 o’clock. The pathology showed invasive ductal carcinoma, moderately to poorly differentiated with focal signet ring cell features and a large geographic area of coagulative tumoral necrosis, suspicious for lymphovascular invasion. It was ER pos 100%, NE pos 80%, HER 2 negative, Ki-67 index 15%. \par \par On 6/30/2020, she had an MRI of bilateral breasts, which showed biopsy-proven malignancy with biopsy clip and surrounding hematoma at the superior aspect of the right breast. No additional areas of abnormal enhancement. No MRI evidence of malignancy in the left breast. \par  \par On 7/28/2020, she underwent a right lumpectomy and sentinel node biopsy. The pathology revealed 1.1 cm invasive carcinoma, negative EIC. Superior surgical margin was positive. There was 1/1 positive sentinel lymph node measuring 2.5 mm. No ZACH. LVSI could not be determined. The AJCC 8th edition pathologic stage is IIA ([id number]).\par \par On 8/21/2020, patient underwent re-excision of the right breast superior margin and pathology showed no residual disease. \par \par The surgical specimen was sent for Oncotype dx assay. Her RS is 13, predicting 13% risk of distant recurrence at 9 years with endocrine therapy alone.\par \par She has recovered well from surgery. She saw Dr. Mcclelland and was recommended to have adjuvant WBI. \par  [de-identified] : 1/7/2021:\par The patient is evaluated via telehealth with phone only. She has stage IIA ([id number]) IDC of the right breast, G2, ER/MN positive, her-2 negative, s/p right lumpectomy and SLNB, with negative margins. Oncotype RS is 13, in the low risk range. She saw Dr. Mcclelland and completed adjuvant whole breast radiation on 11/17/2020. She started on adjuvant endocrine therapy with Letrozole and tolerated well so far. She has some mild aches and pains which are bearable. She was given a referral for bone density but it was not done yet. \par \par 9/10/21: \monique WESTBROOK presents for followup of her stage IIA ([id number]) IDC of the right breast, G2, ER/MN positive, her-2 negative, s/p right lumpectomy and SLNB, with negative margins. Oncotype RS is 13, in the low risk range. She saw Dr. Mcclelland and completed adjuvant whole breast radiation on 11/17/2020. She started on adjuvant endocrine therapy with Letrozole. SHe is tolerating it well, has some hot flashes described as feeling hot at night requiring the AC and she also c/o chronic lower back pain. \par She has refused to get DEXA. She states she will not take any additional medication even if she has osteoporosis so there is no reason to get it. We discussed that letrozole puts her at increased risk, she understands and declines imaging and medication. Similarly, she will not take Vitamin D and calcium as well.  \par She had bilateral diagnostic mammo June 2021 which was BI-RADS 2 and she had right breast US in March 2021 which was also BI-RADS 2.  \par \par 4/5/22:\monique Westbrook presented for virtual televisit. Her  is next to her. She has stage IIA ([id number]) IDC of the right breast, G2, ER/MN positive, her-2 negative, s/p right lumpectomy and SLNB, with negative margins. Oncotype RS is 13, in the low risk range. She completed adjuvant whole breast radiation on 11/17/2020. She has been taking adjuvant endocrine therapy with Letrozole. She complains weight gain, hot flashes and hair thinning. She had right breast dx mammo in 12/2021. There was no suspicious finding.

## 2022-04-05 NOTE — PHYSICAL EXAM
[Fully active, able to carry on all pre-disease performance without restriction] : Status 0 - Fully active, able to carry on all pre-disease performance without restriction [Normal] : well developed, well nourished, in no acute distress [de-identified] : physical exam deferred (telehealth),

## 2022-06-14 ENCOUNTER — RESULT REVIEW (OUTPATIENT)
Age: 82
End: 2022-06-14

## 2022-06-14 ENCOUNTER — OUTPATIENT (OUTPATIENT)
Dept: OUTPATIENT SERVICES | Facility: HOSPITAL | Age: 82
LOS: 1 days | Discharge: HOME | End: 2022-06-14
Payer: MEDICARE

## 2022-06-14 DIAGNOSIS — Z98.890 OTHER SPECIFIED POSTPROCEDURAL STATES: Chronic | ICD-10-CM

## 2022-06-14 DIAGNOSIS — R92.2 INCONCLUSIVE MAMMOGRAM: ICD-10-CM

## 2022-06-14 DIAGNOSIS — Z12.31 ENCOUNTER FOR SCREENING MAMMOGRAM FOR MALIGNANT NEOPLASM OF BREAST: ICD-10-CM

## 2022-06-14 DIAGNOSIS — T82.898A OTHER SPECIFIED COMPLICATION OF VASCULAR PROSTHETIC DEVICES, IMPLANTS AND GRAFTS, INITIAL ENCOUNTER: Chronic | ICD-10-CM

## 2022-06-14 DIAGNOSIS — Z96.652 PRESENCE OF LEFT ARTIFICIAL KNEE JOINT: Chronic | ICD-10-CM

## 2022-06-14 PROCEDURE — 77066 DX MAMMO INCL CAD BI: CPT | Mod: 26

## 2022-06-14 PROCEDURE — 76641 ULTRASOUND BREAST COMPLETE: CPT | Mod: 26,50

## 2022-06-14 PROCEDURE — G0279: CPT | Mod: 26

## 2022-06-21 ENCOUNTER — APPOINTMENT (OUTPATIENT)
Dept: BREAST CENTER | Facility: CLINIC | Age: 82
End: 2022-06-21

## 2022-07-21 ENCOUNTER — APPOINTMENT (OUTPATIENT)
Dept: HEMATOLOGY ONCOLOGY | Facility: CLINIC | Age: 82
End: 2022-07-21

## 2022-07-28 ENCOUNTER — APPOINTMENT (OUTPATIENT)
Dept: BREAST CENTER | Facility: CLINIC | Age: 82
End: 2022-07-28

## 2022-07-28 VITALS
SYSTOLIC BLOOD PRESSURE: 112 MMHG | BODY MASS INDEX: 22.66 KG/M2 | DIASTOLIC BLOOD PRESSURE: 66 MMHG | WEIGHT: 120 LBS | HEIGHT: 61 IN

## 2022-07-28 DIAGNOSIS — C50.111 MALIGNANT NEOPLASM OF CENTRAL PORTION OF RIGHT FEMALE BREAST: ICD-10-CM

## 2022-07-28 DIAGNOSIS — Z17.0 MALIGNANT NEOPLASM OF CENTRAL PORTION OF RIGHT FEMALE BREAST: ICD-10-CM

## 2022-07-28 PROCEDURE — 99213 OFFICE O/P EST LOW 20 MIN: CPT

## 2022-07-28 NOTE — HISTORY OF PRESENT ILLNESS
[FreeTextEntry1] : Wisam is a 80 F who presents with a right breast cancer, s/p R WLE and SLN Bx on 2020, s/p R breast re-excision on 2020, for a lA1nM2H0, ER/NM pos, Her 2 NEG, anatomic stage IIA breast cancer. \par \par 2020 -- R WLE and SLN Bx \par -retroareolar mass: IDC, SBR 2, T=1.1cm, +DCIS; positive SUPERIOR margin \par - SLN pos for metastatic disease \par -neg additional anterior, medial, posterior, lateral margins \par \par 2020 -- R WLE of superior margin \par -benign breast tissue \par \par 10/21/2020-2020 -- s/p R WBI (5240 gy)\par \par -on letrozole since 10/1/2020\par \par Her work up was as follows: \par 2020 -- b/l dx tomosynthesis and R breast US \par -scattered areas of fibroglandular densities\par -R: superior breast, anterior depth, 1.5 cm high density superficial mass\par -no suspicious microcalcifications b/l \par -no discrete mass in L breast \par R US \par -@12N3-4, highly vascular irregular hypoechoic mass, measuring 1.4 x 1 x 1.5 cm; correlates with mammo findings \par -no lymphadenopathy \par BIRADS 5 \par \par 2020 -- R US CNBx @12N3-4\par -IDC, moderately to poorly differentiated\par -suspicious for LVI \par -ER/NM positive, Her 2 neg\par -Ki 67 15%\par \par She first palpated a right breast mass 1 month prior.  She thinks that it has increased in size since she first felt it.  She is a little sore since her biopsy, but she did develop a hematoma afterwards.  She denies any left sided breast complaints and denies any nipple discharge or retraction. \par \par Of note, she underwent a knee replacement surgery several years prior.  At that time, she had un-diagnosed CHF and was subsequently found to have a diseased aortic valve as well as coronary artery disease.  She subsequently underwent a CABG and aortic valve tissue based replacement.  Since her surgery, she has not had any chest pains/angina, or shortness of breath.  She has had several episodes of TIA, which has been attributed to her heart disease.  She has not been found to have any carotid artery disease, however.  She is following up with her cardiologist next week to decide if she will need to continue both the plavix and aspirin.   \par \par HISTORICAL RISK FACTORS: \par -1 prior breast biopsy as above, no prior breast surgeries \par -no family history of breast or ovarian cancer \par -, age at first live birth was 37 \par -no prior OCP use \par -no gyn surgeries\par \par INTERVAL HISTORY:\monique Westbrook returns for her FIRST POST OP VISIT, s/p R WLE and SLN bx on 2020 and a R breast re-excision on 2020.\par \par She is healing well from her recent surgery.  She denies any pain, fevers, chills, drainage, or redness. \par \par Her final pathology revealed a 1.1 cm IDC,with DCIS, eventual negative margins, and  SLN positive for metastatic disease. An oncotype was13, so no chemotherapy was recommended.  \par \par 2021\monique Westbrook returns for a 6 month follow up visit.  \par \par Since her last visit, she has since undergone R WBI and has been taking letrozole since 10/2020. \par \par She has no breast related complaints at this time.  She denies any breast pain, has not palpated any new palpable masses in either breast and denies any nipple discharge or retraction.  \par \par She has no bony pains or hot flashes. \par \par Her most recent imaging was performed on 2021, a b/l dx mammogram, which revealed post surgical distortion at her lumpectomy site in her superior R breast, deemed BIRADS 2. \par \par INTERVAL HISTORY 22\monique Westbrook returns for a 6 month follow up visit.  \par She has no breast related complaints at this time.  She denies any breast pain, has not palpated any new palpable masses in either breast and denies any nipple discharge or retraction.\par \par Her imaging is as follows:\par 2021 RIGHT dx mammo\par -breasts are heterogeneously dense\par -Postsurgical architectural distortion at the lumpectomy site in the right breast.\par BIRADS2\par \par SOZO L-Dex Score:  right arm -10.4\par Date: 22\par \par 2022 -- \par WISAM GIPSON is a 81 year old female patient who presents today in follow up.\par \par She has no breast related complaints at this time.  She denies any breast pain, has not palpated any new palpable masses in either breast and denies any nipple discharge or retraction.\par \par Her most recent imaging is as follows:\par B/L Dx Mammo & Sono - 2022:\par -The breasts are heterogeneously dense.\par -There are expected post lumpectomy changes in the upper outer quadrant of the right breast including architectural distortion, fat necrosis, and asymmetry. \par Bilateral whole breast ultrasound was performed.\par -Postsurgical scarring is seen underlying the right upper outer quadrant lumpectomy site.\par -No right or left axillary lymphadenopathy.\par BI-RADS Category 2: Benign

## 2022-07-28 NOTE — ASSESSMENT
[FreeTextEntry1] : Dariana is a 81 F who presents with a right breast cancer, s/p R WLE and SLN Bx on 7/28/2020, s/p R breast re-excision on 8/21/2020, for a [id number], ER/SD pos, Her 2 NEG, anatomic stage IIA breast cancer. \par \par 7/28/2020 -- R WLE and SLN Bx \par -retroareolar mass: IDC, SBR 2, T=1.1cm, +DCIS; positive SUPERIOR margin \par -1/1 SLN pos for metastatic disease \par -neg additional anterior, medial, posterior, lateral margins \par \par 8/24/2020 -- R WLE of superior margin \par -benign breast tissue \par \par 10/21/2020-11/17/2020 -- s/p R WBI (5240 gy)\par \par -on letrozole since 10/1/2020\par \par On exam, no suspicious abnormalities were palpated within either breast.  She has post radiation changes and some scar retraction in her right superior breast at her surgical site.  \par \par Her most recent imaging is as follows:\par B/L Dx Mammo & Sono - 06/14/2022:\par -The breasts are heterogeneously dense.\par -There are expected post lumpectomy changes in the upper outer quadrant of the right breast including architectural distortion, fat necrosis, and asymmetry. \par Bilateral whole breast ultrasound was performed.\par -Postsurgical scarring is seen underlying the right upper outer quadrant lumpectomy site.\par -No right or left axillary lymphadenopathy.\par BI-RADS Category 2: Benign\par \par \par She will be due for a Right Uni Dx Mammo in December 2022.  This will be scheduled for her today.  I will have her follow up after for a CBE. \par \par AS REVIEW: \par Her final pathology revealed a 1.1 cm IDC, ER/SD pos, Her 2 neg, SBR 2, with 1/1 SLN positive for metastatic disease without extracapsular extension.  She initially had a positive superior margin, however on re-excision, no further areas of disease were noted.  \par  \par In regards to her RIGHT sided breast cancer, she is a great candidate for either breast conservation surgery or a mastectomy.  Her lesion is located @12N3-4   based off the US so she is a candidate for a nipple sparing mastectomy.  However, there is no difference in survival between a lumpectomy + radiation therapy and a mastectomy.  However the rate of local recurrence is slightly higher with the lumpectomy. The rate of recurrence with a mastectomy is not zero, however, and is likely closer to 4-9%.  \par \par In regards to systemic therapy, we briefly discussed both chemotherapy and endocrine therapy. If the tumor is larger than 1 cm and her 2 negative, we would likely need to send an additional study on her tumor sample, called an oncotype DX to make the determination regarding if chemotherapy would help her.  At the completion of all these treatments, she should get endocrine therapy, at current time she would need an AI, for a total of at least 5 years.  \par \par Per ASBrS consensus guidelines, any patient with a diagnosis of breast cancer should be considered for panel genetic testing, as 10% of patients were found to have a mutation on panel testing.  This was offered to the patient today as she does have two daughters and she has agreed to panel genetic testing.  INVITAE panel genetic testing was performed which revealed a VUS in SDHB.  THese results were discussed with Dariana and no additional testing or screening are indicated for VUS.  \par \par All of her questions were answered.  She knows to call with any further questions or concerns.  \par I have spoken with her daughter, Autumn Torres ([phone]) and discussed the results with her as well. \par \par PLAN: \par - Right Uni Dx Mammo - December 2022.\par - f/u after.

## 2022-07-28 NOTE — DATA REVIEWED
[FreeTextEntry1] : B/L Dx Mammo & Sono - 06/14/2022:\par \par INTERPRETATION:  Clinical History / Reason for exam: Personal history of breast cancer status post right lumpectomy in July 2020, as well as radiation.\par \par The patient reports last clinical breast examination was performed about: Within the past year.\par \par Family history of breast cancer: There is no family history of breast cancer.\par \par Comparisons: Prior mammograms dating back to 2020.\par \par Views obtained: Bilateral CC and MLO projections as well as digital tomosynthesis images as well as right magnification views. Computer-aided detection was utilized in the interpretation of this examination.\par \par Breast composition: The breasts are heterogeneously dense, which may obscure small masses.\par \par Findings:\par \par Mammogram:\par \par There are expected post lumpectomy changes in the upper outer quadrant of the right breast including architectural distortion, fat necrosis, and asymmetry.  No suspicious mass, microcalcifications or areas of architectural distortion seen in either breast.\par \par Ultrasound:\par \par Bilateral whole breast ultrasound was performed.\par \par Postsurgical scarring is seen underlying the right upper outer quadrant lumpectomy site.\par \par There is no solid or cystic mass noted in either breast. No right or left axillary lymphadenopathy.\par \par Impression:\par \par 1. Expected post lumpectomy changes in the upper-outer quadrant of the right breast, benign.\par \par 2. No mammographic or sonographic evidence of malignancy in the right or left breast.\par \par Recommendation: As per post lumpectomy routine, a follow-up unilateral right mammogram is recommended.\par \par BI-RADS Category 2: Benign

## 2022-07-28 NOTE — REASON FOR VISIT
[Follow-Up: _____] : a [unfilled] follow-up visit [Spouse] : spouse [FreeTextEntry1] : right breast cancer, s/p R WLE and SLN Bx on 7/28/2020, s/p R breast re-excision on 8/21/2020, for a [id number], ER/WV pos, Her 2 NEG, anatomic stage IIA breast cancer.

## 2022-07-28 NOTE — PHYSICAL EXAM
[Normocephalic] : normocephalic [Atraumatic] : atraumatic [EOMI] : extra ocular movement intact [No Supraclavicular Adenopathy] : no supraclavicular adenopathy [No Cervical Adenopathy] : no cervical adenopathy [Examined in the supine and seated position] : examined in the supine and seated position [Symmetrical] : symmetrical [No dominant masses] : no dominant masses in right breast  [No dominant masses] : no dominant masses left breast [No Nipple Retraction] : no left nipple retraction [No Nipple Discharge] : no left nipple discharge [No Axillary Lymphadenopathy] : no left axillary lymphadenopathy [Soft] : abdomen soft [Not Tender] : non-tender [No Edema] : no edema [No Rashes] : no rashes [No Ulceration] : no ulceration [de-identified] : in her superior breast @12N3-4, there was no suspicious abnormalities palpated within this area, she does have some scar retraction. [de-identified] : no suspicious abnormalities noted on exam

## 2023-02-09 ENCOUNTER — APPOINTMENT (OUTPATIENT)
Dept: RADIATION ONCOLOGY | Facility: HOSPITAL | Age: 83
End: 2023-02-09

## 2023-09-30 NOTE — DATA REVIEWED
[FreeTextEntry1] : EXAM: US BREAST COMPLETE RT\par EXAM: MG MAMMO DIAG W RICARDO BI#\par \par *** ADDENDUM 06/09/2020 ***\par \par Dr. SOL Washburn is notified of the result and recommendations on 6/9/2020 at\par 12:18 PM.\par \par \par *** END OF ADDENDUM 06/09/2020 ***\par \par \par \par PROCEDURE DATE: 06/09/2020\par \par \par \par INTERPRETATION: Clinical History / Reason for exam: 79 years old patient\par for bilateral diagnostic mammography and right breast ultrasound. She is\par complaining of an area of palpable concern in her right breast.\par \par Last CBE: Within the past month.\par \par FAMILY HISTORY OF BREAST CANCER: None.\par \par TECHNIQUE: Bilateral full field digital mammography including Tomosynthesis\par in CC and MLO projections. Spot compression images in the area of palpable\par concern after placing a metal marker in the region of concern, as indicated\par by the patient. CAD was also utilized.\par \par COMPARISON: None available. The patient has not had mammography over the\par past 20 years.\par \par BREAST COMPOSITION: There are scattered areas of fibroglandular density.\par \par FINDINGS:\par \par MAMMOGRAM:\par Corresponding to the area of palpable concern, in the right superior breast\par at anterior depth, there is a 15 mm high density superficial mass. No\par suspicious microcalcifications are seen on either side. No discrete mass is\par seen in the left breast.\par \par Skin, nipples and subcutaneous tissues are unremarkable.\par \par BREAST ULTRASOUND:\par High-resolution ultrasound of the right breast and right axilla was\par performed.\par \par At the right 12:00 axis, 3 to 4 cm from the nipple, there is a highly\par vascular irregular hypoechoic mass measuring 14 x 10 x 15 mm, which would\par correspond to the mammographic finding. There is no right axillary\par lymphadenopathy.\par \par IMPRESSION:\par \par Mammography demonstrates a high density mass in the right superior breast\par which would correspond to the sonographic finding at the 12 o'clock axis.\par The finding is highly suspicious for malignancy. Ultrasound-guided biopsy is\par suggested.\par \par Recommendation: Ultrasound-guided biopsy.\par \par BI-RADS Category 5: Highly Suggestive of Malignancy\par \par The findings and recommendations were discussed with the patient prior to\par departure.\par \par ***Please see the addendum at the top of this report. It may contain\par additional important information or changes.****\par \par \par \par \par LOTTIE GERONIMO M.D., ATTENDING RADIOLOGIST\par This document has been electronically signed. Jun 9 2020 9:38AM\par Addend: LOTTIE GERONIMO M.D., ATTENDING RADIOLOGIST\par This addendum was electronically signed on: Jun 9 2020 12:20PM.\par \par EXAM: US BX BRST 1ST RT SISC\par \par *** ADDENDUM 06/18/2020 ***\par \par Pathology:\par \par -Invasive moderately to poorly differentiated ductal carcinoma with focal\par signet ring cell features and a large geographic area of coagulative tumoral\par necrosis.\par -Foci suspicious for lymphovascular invasion are seen.\par \par This is malignant histology. Surgical excision/consultation is recommended.\par Findings and recommendations were discussed with the patient and her\par daughter on 6/18/2020.\par \par \par *** END OF ADDENDUM 06/18/2020 ***\par \par \par \par PROCEDURE DATE: 06/17/2020\par \par \par \par INTERPRETATION:\par CLINICAL HISTORY: Right breast mass.\par \par PROCEDURES: Right breast ultrasound guided spring loaded core biopsy and\par post clip placement two view right mammogram.\par \par TECHNIQUE: Previous films and reports were reviewed. The procedure, its\par risks, benefits, and alternatives were explained to the patient, who\par expressed understanding and gave informed written and verbal consent. A\par time-out, which included the patient's full name, date of birth, description\par of the expected procedure and procedure site, was performed immediately\par before the procedure.\par \par Using the usual sterile technique and ultrasound guidance, the previously\par described mass in the right breast, 12:00 axis, was biopsied utilizing a\par 14-gauge automated Bard core biopsy device with a 13-gauge introducer\par sheath. 5 samples were collected. A marking clip ([employer] stop-light) was\par deployed under ultrasound guidance. Hemostasis was obtained and a sterile\par dressing was applied.\par \par A right mammogram was performed. This demonstrates the biopsy clip to be\par appropriately positioned.\par \par The patient tolerated the procedure well and left the department in good\par condition with written discharge instructions.\par \par \par IMPRESSION:\par \par Successful ultrasound guided core biopsy of the right breast.\par \par Pathology: Pending, to be reported as an addendum.\par \par \par ***Please see the addendum at the top of this report. It may contain\par additional important information or changes.****\par \par \par \par \par STAN LAWSON M.D., ATTENDING RADIOLOGIST\par This document has been electronically signed. Jun 17 2020 9:08AM\par Addend: STAN LAWSON M.D., ATTENDING RADIOLOGIST\par This addendum was electronically signed on: Jun 18 2020 2:28PM.\par  Medical Decision Making

## 2024-07-07 ENCOUNTER — NON-APPOINTMENT (OUTPATIENT)
Age: 84
End: 2024-07-07

## 2024-11-11 ENCOUNTER — OUTPATIENT (OUTPATIENT)
Dept: OUTPATIENT SERVICES | Facility: HOSPITAL | Age: 84
LOS: 1 days | End: 2024-11-11
Payer: MEDICARE

## 2024-11-11 DIAGNOSIS — R93.89 ABNORMAL FINDINGS ON DIAGNOSTIC IMAGING OF OTHER SPECIFIED BODY STRUCTURES: ICD-10-CM

## 2024-11-11 DIAGNOSIS — Z96.652 PRESENCE OF LEFT ARTIFICIAL KNEE JOINT: Chronic | ICD-10-CM

## 2024-11-11 DIAGNOSIS — T82.898A OTHER SPECIFIED COMPLICATION OF VASCULAR PROSTHETIC DEVICES, IMPLANTS AND GRAFTS, INITIAL ENCOUNTER: Chronic | ICD-10-CM

## 2024-11-11 DIAGNOSIS — Z98.890 OTHER SPECIFIED POSTPROCEDURAL STATES: Chronic | ICD-10-CM

## 2024-11-11 DIAGNOSIS — Z00.8 ENCOUNTER FOR OTHER GENERAL EXAMINATION: ICD-10-CM

## 2024-11-11 PROCEDURE — 78803 RP LOCLZJ TUM SPECT 1 AREA: CPT

## 2024-11-11 PROCEDURE — 78803 RP LOCLZJ TUM SPECT 1 AREA: CPT | Mod: 26,MH

## 2024-11-11 PROCEDURE — A9584: CPT

## 2024-11-12 DIAGNOSIS — R93.89 ABNORMAL FINDINGS ON DIAGNOSTIC IMAGING OF OTHER SPECIFIED BODY STRUCTURES: ICD-10-CM

## 2025-01-13 NOTE — REASON FOR VISIT
Called back and she is feeling better.  Recommend taking a Hydroxyzine tonight at  and again before leaves for the airport ( has a  ).     [Follow-Up Visit] : a follow-up [FreeTextEntry2] : right breast cancer.

## 2025-01-15 ENCOUNTER — APPOINTMENT (OUTPATIENT)
Dept: CARDIOTHORACIC SURGERY | Facility: CLINIC | Age: 85
End: 2025-01-15
Payer: MEDICARE

## 2025-01-15 VITALS
RESPIRATION RATE: 14 BRPM | OXYGEN SATURATION: 95 % | HEART RATE: 69 BPM | WEIGHT: 117 LBS | HEIGHT: 61 IN | BODY MASS INDEX: 22.09 KG/M2

## 2025-01-15 DIAGNOSIS — Z95.1 PRESENCE OF AORTOCORONARY BYPASS GRAFT: ICD-10-CM

## 2025-01-15 PROCEDURE — 99204 OFFICE O/P NEW MOD 45 MIN: CPT

## 2025-01-15 RX ORDER — CEPHALEXIN 500 MG/1
500 CAPSULE ORAL
Qty: 21 | Refills: 0 | Status: ACTIVE | COMMUNITY
Start: 2025-01-15 | End: 1900-01-01

## 2025-01-17 ENCOUNTER — RESULT REVIEW (OUTPATIENT)
Age: 85
End: 2025-01-17

## 2025-01-17 ENCOUNTER — OUTPATIENT (OUTPATIENT)
Dept: OUTPATIENT SERVICES | Facility: HOSPITAL | Age: 85
LOS: 1 days | End: 2025-01-17
Payer: MEDICARE

## 2025-01-17 DIAGNOSIS — Z96.652 PRESENCE OF LEFT ARTIFICIAL KNEE JOINT: Chronic | ICD-10-CM

## 2025-01-17 DIAGNOSIS — Z98.890 OTHER SPECIFIED POSTPROCEDURAL STATES: Chronic | ICD-10-CM

## 2025-01-17 DIAGNOSIS — Z95.1 PRESENCE OF AORTOCORONARY BYPASS GRAFT: ICD-10-CM

## 2025-01-17 DIAGNOSIS — T82.898A OTHER SPECIFIED COMPLICATION OF VASCULAR PROSTHETIC DEVICES, IMPLANTS AND GRAFTS, INITIAL ENCOUNTER: Chronic | ICD-10-CM

## 2025-01-17 PROCEDURE — 71046 X-RAY EXAM CHEST 2 VIEWS: CPT

## 2025-01-17 PROCEDURE — 71046 X-RAY EXAM CHEST 2 VIEWS: CPT | Mod: 26

## 2025-01-18 DIAGNOSIS — Z95.1 PRESENCE OF AORTOCORONARY BYPASS GRAFT: ICD-10-CM

## 2025-01-22 ENCOUNTER — APPOINTMENT (OUTPATIENT)
Dept: CARDIOTHORACIC SURGERY | Facility: CLINIC | Age: 85
End: 2025-01-22
Payer: MEDICARE

## 2025-01-22 VITALS
SYSTOLIC BLOOD PRESSURE: 120 MMHG | OXYGEN SATURATION: 96 % | HEART RATE: 60 BPM | RESPIRATION RATE: 14 BRPM | BODY MASS INDEX: 22.09 KG/M2 | DIASTOLIC BLOOD PRESSURE: 68 MMHG | WEIGHT: 117 LBS | HEIGHT: 61 IN

## 2025-01-22 PROCEDURE — 99212 OFFICE O/P EST SF 10 MIN: CPT

## 2025-05-06 ENCOUNTER — APPOINTMENT (OUTPATIENT)
Dept: ORTHOPEDIC SURGERY | Facility: CLINIC | Age: 85
End: 2025-05-06
Payer: MEDICARE

## 2025-05-06 DIAGNOSIS — M25.511 PAIN IN RIGHT SHOULDER: ICD-10-CM

## 2025-05-06 PROCEDURE — 73030 X-RAY EXAM OF SHOULDER: CPT | Mod: RT

## 2025-05-06 PROCEDURE — 99203 OFFICE O/P NEW LOW 30 MIN: CPT

## 2025-05-12 ENCOUNTER — APPOINTMENT (OUTPATIENT)
Dept: PAIN MANAGEMENT | Facility: CLINIC | Age: 85
End: 2025-05-12
Payer: MEDICARE

## 2025-05-12 DIAGNOSIS — M89.8X1 OTHER SPECIFIED DISORDERS OF BONE, SHOULDER: ICD-10-CM

## 2025-05-12 DIAGNOSIS — M79.18 MYALGIA, OTHER SITE: ICD-10-CM

## 2025-05-12 PROCEDURE — 20552 NJX 1/MLT TRIGGER POINT 1/2: CPT | Mod: RT

## 2025-05-12 PROCEDURE — 99204 OFFICE O/P NEW MOD 45 MIN: CPT | Mod: 25

## 2025-06-09 ENCOUNTER — APPOINTMENT (OUTPATIENT)
Dept: PAIN MANAGEMENT | Facility: CLINIC | Age: 85
End: 2025-06-09

## 2025-06-19 ENCOUNTER — APPOINTMENT (OUTPATIENT)
Dept: PAIN MANAGEMENT | Facility: CLINIC | Age: 85
End: 2025-06-19
Payer: MEDICARE

## 2025-06-19 PROCEDURE — 99213 OFFICE O/P EST LOW 20 MIN: CPT
